# Patient Record
Sex: FEMALE | Race: BLACK OR AFRICAN AMERICAN | NOT HISPANIC OR LATINO
[De-identification: names, ages, dates, MRNs, and addresses within clinical notes are randomized per-mention and may not be internally consistent; named-entity substitution may affect disease eponyms.]

---

## 2018-03-13 PROBLEM — Z00.00 ENCOUNTER FOR PREVENTIVE HEALTH EXAMINATION: Status: ACTIVE | Noted: 2018-03-13

## 2018-03-22 ENCOUNTER — APPOINTMENT (OUTPATIENT)
Dept: MRI IMAGING | Facility: HOSPITAL | Age: 48
End: 2018-03-22

## 2018-09-11 ENCOUNTER — OUTPATIENT (OUTPATIENT)
Dept: OUTPATIENT SERVICES | Facility: HOSPITAL | Age: 48
LOS: 1 days | End: 2018-09-11
Payer: COMMERCIAL

## 2018-09-11 ENCOUNTER — APPOINTMENT (OUTPATIENT)
Dept: MRI IMAGING | Facility: HOSPITAL | Age: 48
End: 2018-09-11

## 2018-09-11 PROCEDURE — 72148 MRI LUMBAR SPINE W/O DYE: CPT

## 2018-09-11 PROCEDURE — 72148 MRI LUMBAR SPINE W/O DYE: CPT | Mod: 26

## 2019-01-18 ENCOUNTER — APPOINTMENT (OUTPATIENT)
Dept: ULTRASOUND IMAGING | Facility: HOSPITAL | Age: 49
End: 2019-01-18

## 2019-09-07 ENCOUNTER — OUTPATIENT (OUTPATIENT)
Dept: OUTPATIENT SERVICES | Facility: HOSPITAL | Age: 49
LOS: 1 days | End: 2019-09-07
Payer: COMMERCIAL

## 2019-09-07 ENCOUNTER — APPOINTMENT (OUTPATIENT)
Dept: ULTRASOUND IMAGING | Facility: HOSPITAL | Age: 49
End: 2019-09-07
Payer: COMMERCIAL

## 2019-09-07 PROCEDURE — 76770 US EXAM ABDO BACK WALL COMP: CPT

## 2019-09-07 PROCEDURE — 76770 US EXAM ABDO BACK WALL COMP: CPT | Mod: 26

## 2019-09-12 ENCOUNTER — EMERGENCY (EMERGENCY)
Facility: HOSPITAL | Age: 49
LOS: 1 days | Discharge: HOME | End: 2019-09-12
Attending: STUDENT IN AN ORGANIZED HEALTH CARE EDUCATION/TRAINING PROGRAM
Payer: COMMERCIAL

## 2019-09-12 VITALS
OXYGEN SATURATION: 100 % | SYSTOLIC BLOOD PRESSURE: 174 MMHG | DIASTOLIC BLOOD PRESSURE: 87 MMHG | RESPIRATION RATE: 16 BRPM | HEART RATE: 76 BPM | TEMPERATURE: 99 F

## 2019-09-12 VITALS
HEART RATE: 72 BPM | SYSTOLIC BLOOD PRESSURE: 169 MMHG | RESPIRATION RATE: 18 BRPM | OXYGEN SATURATION: 99 % | TEMPERATURE: 99 F | DIASTOLIC BLOOD PRESSURE: 58 MMHG

## 2019-09-12 LAB
ALBUMIN SERPL ELPH-MCNC: 4.6 G/DL — SIGNIFICANT CHANGE UP (ref 3.3–5)
ALP SERPL-CCNC: 119 U/L — SIGNIFICANT CHANGE UP (ref 40–120)
ALT FLD-CCNC: 20 U/L — SIGNIFICANT CHANGE UP (ref 4–33)
ANION GAP SERPL CALC-SCNC: 15 MMO/L — HIGH (ref 7–14)
AST SERPL-CCNC: 28 U/L — SIGNIFICANT CHANGE UP (ref 4–32)
BASOPHILS # BLD AUTO: 0.04 K/UL — SIGNIFICANT CHANGE UP (ref 0–0.2)
BASOPHILS NFR BLD AUTO: 0.2 % — SIGNIFICANT CHANGE UP (ref 0–2)
BILIRUB SERPL-MCNC: 0.3 MG/DL — SIGNIFICANT CHANGE UP (ref 0.2–1.2)
BUN SERPL-MCNC: 8 MG/DL — SIGNIFICANT CHANGE UP (ref 7–23)
CALCIUM SERPL-MCNC: 9.5 MG/DL — SIGNIFICANT CHANGE UP (ref 8.4–10.5)
CHLORIDE SERPL-SCNC: 101 MMOL/L — SIGNIFICANT CHANGE UP (ref 98–107)
CO2 SERPL-SCNC: 22 MMOL/L — SIGNIFICANT CHANGE UP (ref 22–31)
CREAT SERPL-MCNC: 0.8 MG/DL — SIGNIFICANT CHANGE UP (ref 0.5–1.3)
EOSINOPHIL # BLD AUTO: 0.45 K/UL — SIGNIFICANT CHANGE UP (ref 0–0.5)
EOSINOPHIL NFR BLD AUTO: 2.7 % — SIGNIFICANT CHANGE UP (ref 0–6)
GLUCOSE SERPL-MCNC: 109 MG/DL — HIGH (ref 70–99)
HCT VFR BLD CALC: 40.1 % — SIGNIFICANT CHANGE UP (ref 34.5–45)
HGB BLD-MCNC: 11.6 G/DL — SIGNIFICANT CHANGE UP (ref 11.5–15.5)
IMM GRANULOCYTES NFR BLD AUTO: 0.6 % — SIGNIFICANT CHANGE UP (ref 0–1.5)
LYMPHOCYTES # BLD AUTO: 1.71 K/UL — SIGNIFICANT CHANGE UP (ref 1–3.3)
LYMPHOCYTES # BLD AUTO: 10.2 % — LOW (ref 13–44)
MAGNESIUM SERPL-MCNC: 2.1 MG/DL — SIGNIFICANT CHANGE UP (ref 1.6–2.6)
MCHC RBC-ENTMCNC: 26.1 PG — LOW (ref 27–34)
MCHC RBC-ENTMCNC: 28.9 % — LOW (ref 32–36)
MCV RBC AUTO: 90.1 FL — SIGNIFICANT CHANGE UP (ref 80–100)
MONOCYTES # BLD AUTO: 1.47 K/UL — HIGH (ref 0–0.9)
MONOCYTES NFR BLD AUTO: 8.8 % — SIGNIFICANT CHANGE UP (ref 2–14)
NEUTROPHILS # BLD AUTO: 13.02 K/UL — HIGH (ref 1.8–7.4)
NEUTROPHILS NFR BLD AUTO: 77.5 % — HIGH (ref 43–77)
NRBC # FLD: 0 K/UL — SIGNIFICANT CHANGE UP (ref 0–0)
PHOSPHATE SERPL-MCNC: 2.5 MG/DL — SIGNIFICANT CHANGE UP (ref 2.5–4.5)
PLATELET # BLD AUTO: 459 K/UL — HIGH (ref 150–400)
PMV BLD: 9.8 FL — SIGNIFICANT CHANGE UP (ref 7–13)
POTASSIUM SERPL-MCNC: 4.6 MMOL/L — SIGNIFICANT CHANGE UP (ref 3.5–5.3)
POTASSIUM SERPL-SCNC: 4.6 MMOL/L — SIGNIFICANT CHANGE UP (ref 3.5–5.3)
PROT SERPL-MCNC: 9.1 G/DL — HIGH (ref 6–8.3)
RBC # BLD: 4.45 M/UL — SIGNIFICANT CHANGE UP (ref 3.8–5.2)
RBC # FLD: 17.2 % — HIGH (ref 10.3–14.5)
SODIUM SERPL-SCNC: 138 MMOL/L — SIGNIFICANT CHANGE UP (ref 135–145)
WBC # BLD: 16.79 K/UL — HIGH (ref 3.8–10.5)
WBC # FLD AUTO: 16.79 K/UL — HIGH (ref 3.8–10.5)

## 2019-09-12 PROCEDURE — 99284 EMERGENCY DEPT VISIT MOD MDM: CPT

## 2019-09-12 RX ORDER — AMPICILLIN SODIUM AND SULBACTAM SODIUM 250; 125 MG/ML; MG/ML
3 INJECTION, POWDER, FOR SUSPENSION INTRAMUSCULAR; INTRAVENOUS ONCE
Refills: 0 | Status: COMPLETED | OUTPATIENT
Start: 2019-09-12 | End: 2019-09-12

## 2019-09-12 RX ORDER — KETOROLAC TROMETHAMINE 30 MG/ML
15 SYRINGE (ML) INJECTION ONCE
Refills: 0 | Status: DISCONTINUED | OUTPATIENT
Start: 2019-09-12 | End: 2019-09-12

## 2019-09-12 RX ADMIN — AMPICILLIN SODIUM AND SULBACTAM SODIUM 200 GRAM(S): 250; 125 INJECTION, POWDER, FOR SUSPENSION INTRAMUSCULAR; INTRAVENOUS at 16:51

## 2019-09-12 RX ADMIN — Medication 15 MILLIGRAM(S): at 16:51

## 2019-09-12 NOTE — ED PROVIDER NOTE - PROGRESS NOTE DETAILS
Discussed case with dental resident. Will come see the patient Dental performed I&D. Can c/w Clindamycin as rx'd by the . Pt has her own outpt dentist to follow up with.

## 2019-09-12 NOTE — ED PROVIDER NOTE - OBJECTIVE STATEMENT
50 y/o F PMHx hypothyroidism, presents to the ED c/o R upper 50 y/o F PMHx hypothyroidism, presents to the ED c/o R upper gum pain x1 day with swelling of her gums and face this morning. Pt states that she was scheduled to have a root canal soon and was treated with Amoxicillin 1 month ago in anticipation of the root canal. SHe went to urgent care today and was told to go to the ED and was rx'd clindamycin 300 mg q8h for which she has taken one dose. Pt denies purulent drainage from the area. Pt endorses minimal drooling but has been able to take PO liquids. Denies dysphagia, change in voice, fever, chills, n/v, cp, sob, or ha.   Has taken motrin/tylenol w/o relief.

## 2019-09-12 NOTE — ED ADULT TRIAGE NOTE - CHIEF COMPLAINT QUOTE
Sent from urgent care for abscess to upper left gum. Given abx but hasn't started yet. Denies fevers.

## 2019-09-12 NOTE — ED PROVIDER NOTE - PHYSICAL EXAMINATION
PHYSICAL EXAM:  GENERAL: non-toxic appearing; in no respiratory distress; no difficulty with phonation; speaks in full sentences  HEAD: Atraumatic, Normocephalic; There is Right sided facial swelling  EYES: PERRL, EOMs intact b/l w/out deficits  ENMT: Moist membranes. There is about a 1cm abscess in the R upper gum above tooth 4/5 without purulent drainage. There is no uvula deviation. The posterior pharyngeal arches are symmetrical and non-erythematous, without swelling, and without exudates. There is cervical lymphadenopathy. No floor of mouth tenderness. Tooth 5 edentulous.   CHEST/LUNG: CTAB no wheezes/rhonchi/rales  HEART: RRR no murmur/gallops/rubs  ABDOMEN: +BS, soft, NT, ND  EXTREMITIES: No LE edema, +2 radial pulses b/l  MUSCULOSKELETAL: FROM of all 4 extremities;  NERVOUS SYSTEM:  A&Ox3, No motor deficits or sensory deficits; CNII-XII intact; no focal neurologic deficits  SKIN:  No new rashes

## 2019-09-12 NOTE — ED PROVIDER NOTE - NSFOLLOWUPINSTRUCTIONS_ED_ALL_ED_FT
You were seen in this emergency department for gum pain and swelling.  The abscess was drained and a drain was put in place. It must be removed in 4 days (9/15/19). Please see your dentist or return to the emergency department for removal of the drain.  Please continue to take your Clindamycin antibiotic as directed.  You may take Motrin and/or Tylenol as needed for pain and fever.  Please return to the emergency department should you develop worsening symptoms, including but not limited to worsening pain, fever, difficulty breathing, difficulty swallowing, chest pain, and/or vomiting.

## 2019-09-12 NOTE — ED PROVIDER NOTE - CLINICAL SUMMARY MEDICAL DECISION MAKING FREE TEXT BOX
50 y/o F PMHx hypothyroidism presents to ED with right upper gum swelling, pain x1 day. No purulent drainage. No fever, dysphagia, sob, cp. VSS. Afebrile. Pt phonating well. There is an approximately 1cm abscess in the right upper gums around teeth 4/5 with facial swelling and cervical l ymphadenopathy. Low concern for RPA/PTA as there is no pooling of secretions, uvula midline, posterior pharyngeal arches symmetrical, non erythematous. Will obtain cbc, cmp, start unasyn, toradol for pain, and consult dental

## 2019-09-12 NOTE — ED PROVIDER NOTE - NS ED ROS FT
Constitutional: no fevers or chills  HEENT: Gum swelling and pain; no visual changes, no sore throat, no rhinorrhea  CV: no cp or palpitations  Resp: no sob or cough  GI: no abd pain, no nausea, no vomiting, no diarrhea, no constipation  : no dysuria, no hematuria  MSK: no myalgais or arthralgias  skin: no rashes  neuro: no HA, no confusion; no numbness; no weakness, no tingling  psych: no SI/HI  heme: no LAD

## 2019-09-12 NOTE — ED PROVIDER NOTE - PATIENT PORTAL LINK FT
You can access the FollowMyHealth Patient Portal offered by [facility] by registering at the following website: [email or web address]. By joining [employer]’s FollowMyHealth portal, you will also be able to view your health information using other applications (apps) compatible with our system.

## 2019-09-12 NOTE — ED PROVIDER NOTE - ATTENDING CONTRIBUTION TO CARE
48 y/o F PMHx hypothyroidism presents to ED with right upper gum swelling, pain x1 day. No purulent drainage. No fever, dysphagia, sob, cp. VSS. Afebrile. Pt phonating well. There is an approximately 1cm abscess in the right upper gums around teeth 4/5 with facial swelling and cervical l ymphadenopathy. Low concern for RPA/PTA as there is no pooling of secretions, uvula midline, posterior pharyngeal arches symmetrical, non erythematous. Will obtain cbc, cmp, start unasyn, toradol for pain, and consult dental    DDavida Tadeo: I have personally performed a face to face bedside history and physical examination of this patient. I have discussed the history, examination, review of systems, assessment and plan of management with the resident. I have reviewed the electronic medical record and amended it to reflect my history, review of systems, physical exam, assessment and plan.

## 2019-09-13 ENCOUNTER — TRANSCRIPTION ENCOUNTER (OUTPATIENT)
Age: 49
End: 2019-09-13

## 2019-09-13 NOTE — PROGRESS NOTE ADULT - SUBJECTIVE AND OBJECTIVE BOX
Patient is a 49y old  Female who presents with a chief complaint of dental abscess    HPI: As per ED: 50 y/o F PMHx hypothyroidism, presents to the ED c/o R upper gum pain x1 day with swelling of her gums and face this morning. Pt states that she was scheduled to have a root canal soon and was treated with Amoxicillin 1 month ago in anticipation of the root canal. She went to urgent care today and was told to go to the ED and was rx'd clindamycin 300 mg q8h for which she has taken one dose. Pt denies purulent drainage from the area. Pt endorses minimal drooling but has been able to take PO liquids. Denies dysphagia, change in voice, fever, chills, n/v, cp, sob, or ha  Patient states that pain began last night at 18:00. She first noticed swelling on Monday. Pain is spontaneous. Patient has been using orajel, allieve, and salt water to alleviate dental pain.     PAST MEDICAL & SURGICAL HISTORY:    ( -  ) heart valve replacement  ( -  ) joint replacement  ( -  ) pregnancy    MEDICATIONS  (STANDING):    MEDICATIONS  (PRN):      Allergies    erythromycin (Vomiting)    Intolerances        FAMILY HISTORY: No pertinent family hx      SOCIAL HISTORY: Pt presents to ED alone    Last Dental Visit: 1 month ago    Vital Signs Last 24 Hrs  T(C): 37.2 (12 Sep 2019 18:39), Max: 37.3 (12 Sep 2019 15:05)  T(F): 99 (12 Sep 2019 18:39), Max: 99.1 (12 Sep 2019 15:05)  HR: 72 (12 Sep 2019 18:39) (72 - 76)  BP: 169/58 (12 Sep 2019 18:39) (169/58 - 174/87)  BP(mean): --  RR: 18 (12 Sep 2019 18:39) (16 - 18)  SpO2: 99% (12 Sep 2019 18:39) (99% - 100%)    EOE:  TMJ ( -  ) clicks                    (  -  ) pops                    (  -  ) crepitus             Mandible FROM             Facial bones and MOM grossly intact             ( -  ) trismus             ( -  ) LAD             ( + ) swelling: left buccal space swelling             ( +  ) asymmetry: left buccal space swelling             ( +  ) palpation: tender to palpation on left maxilla             ( -  ) SOB             ( -  ) dysphagia             ( -  ) LOC    IOE:  permanent dentition: multiple carious teeth and multiple missing teeth           hard/soft palate:  ( -  ) palatal torus           tongue/FOM WNL           labial/buccal mucosa: fluctuant swelling buccal to teeth #13 and #14           (  + ) percussion: percussion tender to tooth #14           (  + ) palpation: tender to palpation in area of fluctuant swelling           (  + ) swelling: fluctuant swelling buccal to teeth #13 and #14, more localized by tooth #13    Dentition present: Complete permanent dentition, Root tip #13  Mobility: No gross mobility noted at time of consult  Caries: Caries tooth #13 and #14    Radiographs: 1 PA of tooth #13/14, interpreted: PARL on tooth #13 and #14, Root tip #13, gross decay #14    LABS:                        11.6   16.79 )-----------( 459      ( 12 Sep 2019 16:48 )             40.1     09-12    138  |  101  |  8   ----------------------------<  109<H>  4.6   |  22  |  0.80    Ca    9.5      12 Sep 2019 16:48  Phos  2.5     09-12  Mg     2.1     09-12    TPro  9.1<H>  /  Alb  4.6  /  TBili  0.3  /  DBili  x   /  AST  28  /  ALT  20  /  AlkPhos  119  09-12    WBC Count: 16.79 K/uL <H> [3.8 - 10.5] (09-12 @ 16:48)    Platelet Count - Automated: 459 K/uL <H> [150 - 400] (09-12 @ 16:48)    ASSESSMENT: Root tip #13, gross decay #13 and #14, PARL present on #13 and #14  Reviewed radiographic and clinical findings with patient. Recommend extraction of tooth #13 and follow up with outpatient dentist for different treatment options for tooth #14. Recommend incision and drainage of fluctuant swelling.    PROCEDURE: EOE/IOE, 1 PA rad, incision and drainage buccal to tooth #13/14  Reviewed RBA. All questions answered. Informed consent obtained.   20% topical benzocaine, 3.0cc 2% lidocaine w/1:100k epi for local infiltration about teeth #13, 14 and abscess. 1.5cm incision made at buccal vestibule by teeth #13 and #14, tissue dissected with hemostat. Purulence drained from abscess. Surrounding tissues palpated firmly to express purulence. Incision site copiously irrigated with sterile saline. Penrose drain secured with 3-0 BSS. POIG. All questions answered.  Verbal and written consent given.     RECOMMENDATIONS:   1) Follow up with [facility] ED or outpatient dentist for removal of drain in 48 hours.  2) Salt water rinses after every meal.  3) Continue Clindamycin which was prescribed to patient by urgent care.  4) Dental F/U with outpatient dentist for comprehensive dental care.   5) If any difficulty swallowing/breathing, fever occur, page dental.     Dee Gilman DDS, #[id number]

## 2019-09-15 ENCOUNTER — EMERGENCY (EMERGENCY)
Facility: HOSPITAL | Age: 49
LOS: 1 days | Discharge: HOME | End: 2019-09-15
Attending: EMERGENCY MEDICINE | Admitting: EMERGENCY MEDICINE

## 2019-09-15 VITALS
OXYGEN SATURATION: 100 % | HEART RATE: 71 BPM | RESPIRATION RATE: 16 BRPM | SYSTOLIC BLOOD PRESSURE: 153 MMHG | DIASTOLIC BLOOD PRESSURE: 79 MMHG | TEMPERATURE: 99 F

## 2019-09-15 NOTE — PROGRESS NOTE ADULT - SUBJECTIVE AND OBJECTIVE BOX
Patient is a 49y old  Female who presents for penrose drain removal.    CC: "I'm here to get my drain taken out."    HPI: Pt states she had swelling upper left maxillary vestibule that extended extraorally to lower eye on Thursday. Pt states she got I&D at [facility] on Thursday (9/12/2019) and swelling/pain has completely resolved.       PAST MEDICAL & SURGICAL HISTORY:  No pertinent past medical history  No significant past surgical history    MEDICATIONS  (STANDING):  None    Allergies:  erythromycin (Vomiting)    *Last Dental Visit: A few weeks ago (does not remember exactly when)    Vital Signs Last 24 Hrs  T(C): 37.1 (15 Sep 2019 15:36), Max: 37.1 (15 Sep 2019 15:36)  T(F): 98.7 (15 Sep 2019 15:36), Max: 98.7 (15 Sep 2019 15:36)  HR: 71 (15 Sep 2019 15:36) (71 - 71)  BP: 153/79 (15 Sep 2019 15:36) (153/79 - 153/79)  BP(mean): --  RR: 16 (15 Sep 2019 15:36) (16 - 16)  SpO2: 100% (15 Sep 2019 15:36) (100% - 100%)    EOE:  TMJ ( -  ) clicks                    ( -   ) pops                    ( -   ) crepitus             Mandible: FROM             Facial bones and MOM: grossly intact             ( -  ) trismus             ( -  ) LAD             ( -  ) swelling             ( -  ) asymmetry             ( -  ) palpation             ( -  ) SOB             ( -  ) dysphagia             ( -  ) LOC  No swellings, no asymmetries, no abnormalities detected.     IOE:   Permanent dentition grossly intact.            hard/soft palate:  WNL, (  - ) palatal torus           tongue/FOM: WNL           labial/buccal mucosa: WNL           ( -  ) percussion           ( -  ) palpation           ( -  ) swelling   Mobility: -    *DENTAL RADIOGRAPHS:  None obtained    ASSESSMENT: F/U for drain removal. No swellings, no asymmetries, no abnormalities detected. Pt states she is completely out of pain and swelling has completely subsided since drain placement on Thursday (9/12/2019).    PROCEDURE: Verbal informed consent obtained. Drain and silk suture removed. Irrigated site with saline solution. Site healing well.    RECOMMENDATIONS:  1) Dental F/U with outpatient dentist for comprehensive dental care  2) Soft diet for next two weeks  3) Chlorhexidine rinse per ED for next 7-10 days  3) If any difficulty swallowing/breathing, fever occur, page dental    Ayesha Frazier [facility], #[id number]

## 2019-09-15 NOTE — ED PROVIDER NOTE - OBJECTIVE STATEMENT
49 year old female with a pmhx of hypothyroidism, presents to ED for removal of dental drain. Patient had the drain placed 2 days ago for a left buccal abscess. She was instructed to return in 48 hours for removal. Patient states her symptoms have improved and pain is minimal. She has been taking the Clindamycin as prescribed. She denies any fever, chills, difficulty swallowing, abd pain, or diarrhea.

## 2019-09-15 NOTE — ED PROVIDER NOTE - ENMT, MLM
Airway patent, Nasal mucosa clear. Mouth with normal mucosa. no abscess visualized at left buccal space Airway patent, Nasal mucosa clear. Mouth with normal mucosa. no abscess visualized at left buccal space, drain at left upper gingival space without surrounding erythema, no active drainage

## 2019-09-15 NOTE — ED PROVIDER NOTE - ATTENDING CONTRIBUTION TO CARE
DR. GONZALES, ATTENDING MD-  I have reviewed and discussed the medical student’s documentation and findings with the student. After personally examining the patient, my findings have been added to this documentation.    48 y/o female s/p i&d and dental drain placement here for scheduled removal.  Denies f/c, pain, drainage or any other complaints.  Taking abx as prescribed.  Denies ha, neck stiffness, cp, sob, cough, abd pain, n/v/d, dysuria, rash.  Afebrile, vs wnl, nad, dental drain in place at left upper incisor without active drainage or gingival swelling, ctabil, s1s2 rrr no m/r/g, abd soft non ttp no r/g, no cva tenderness b/l, no leg swelling b/l, no rash.  Pt here with dental drain for removal of drain, no signs of return of abscess, will consult dental for drain extraction.

## 2019-09-15 NOTE — ED PROVIDER NOTE - PLAN OF CARE
You were seen today for removal of your dental drain.  Rinse your mouth with salt water every night.  Follow up with your dentist for re-evaluation at the next available appointment.  RETURN TO THE EMERGENCY DEPARTMENT IMMEDIATELY IF YOU DEVELOP FEVER, PAIN, PUS DRAINING, OR FOR ANY OTHER CONCERN.

## 2019-09-15 NOTE — ED PROVIDER NOTE - CARE PLAN
Principal Discharge DX:	Dental abscess Principal Discharge DX:	Dental abscess  Assessment and plan of treatment:	You were seen today for removal of your dental drain.  Rinse your mouth with salt water every night.  Follow up with your dentist for re-evaluation at the next available appointment.  RETURN TO THE EMERGENCY DEPARTMENT IMMEDIATELY IF YOU DEVELOP FEVER, PAIN, PUS DRAINING, OR FOR ANY OTHER CONCERN.

## 2019-09-15 NOTE — ED ADULT TRIAGE NOTE - CHIEF COMPLAINT QUOTE
pt amb to triage s/p dental drain placement, as per paperwork advised to come to ED today for drain removal, denies medical complaints/issues @ this time, states presented as per d/c papers

## 2019-09-15 NOTE — ED PROVIDER NOTE - PATIENT PORTAL LINK FT
You can access the FollowMyHealth Patient Portal offered by [facility] by registering at the following website: [email or web address]. By joining [employer]’s FollowMyHealth portal, you will also be able to view your health information using other applications (apps) compatible with our system.

## 2019-09-15 NOTE — ED PROVIDER NOTE - CLINICAL SUMMARY MEDICAL DECISION MAKING FREE TEXT BOX
49 year old female with pmhx of hypothyroid, presents to ED for dental drain removal. Drain was placed 2 days ago for left buccal abscess. Pt with minimal pain, improving on Clindamycin. Dental consulted to evaluate pt in ED for drain removal. Vitals stable, no fevers 49 year old female with pmhx of hypothyroid, presents to ED for dental drain removal. Drain was placed 2 days ago for left buccal abscess. Pt with minimal pain, improving on Clindamycin. Dental consulted to evaluate pt in ED for drain removal. Vitals stable, no fevers    See "Attending Attestation" for the attending's MDM.

## 2019-12-14 ENCOUNTER — EMERGENCY (EMERGENCY)
Facility: HOSPITAL | Age: 49
LOS: 1 days | Discharge: HOME | End: 2019-12-14
Attending: EMERGENCY MEDICINE | Admitting: EMERGENCY MEDICINE
Payer: COMMERCIAL

## 2019-12-14 VITALS
DIASTOLIC BLOOD PRESSURE: 61 MMHG | TEMPERATURE: 99 F | SYSTOLIC BLOOD PRESSURE: 155 MMHG | RESPIRATION RATE: 19 BRPM | OXYGEN SATURATION: 99 % | HEART RATE: 84 BPM

## 2019-12-14 DIAGNOSIS — R10.11 RIGHT UPPER QUADRANT PAIN: ICD-10-CM

## 2019-12-14 DIAGNOSIS — R42 DIZZINESS AND GIDDINESS: ICD-10-CM

## 2019-12-14 DIAGNOSIS — R10.13 EPIGASTRIC PAIN: ICD-10-CM

## 2019-12-14 DIAGNOSIS — R11.0 NAUSEA: ICD-10-CM

## 2019-12-14 LAB
BASOPHILS # BLD AUTO: 0.01 K/UL — SIGNIFICANT CHANGE UP (ref 0–0.2)
BASOPHILS NFR BLD AUTO: 0.1 % — SIGNIFICANT CHANGE UP (ref 0–2)
EOSINOPHIL # BLD AUTO: 0.19 K/UL — SIGNIFICANT CHANGE UP (ref 0–0.5)
EOSINOPHIL NFR BLD AUTO: 2.8 % — SIGNIFICANT CHANGE UP (ref 0–6)
HCT VFR BLD CALC: 34.8 % — SIGNIFICANT CHANGE UP (ref 34.5–45)
HGB BLD-MCNC: 10.7 G/DL — LOW (ref 11.5–15.5)
IMM GRANULOCYTES NFR BLD AUTO: 0.1 % — SIGNIFICANT CHANGE UP (ref 0–1.5)
LYMPHOCYTES # BLD AUTO: 1.3 K/UL — SIGNIFICANT CHANGE UP (ref 1–3.3)
LYMPHOCYTES # BLD AUTO: 18.9 % — SIGNIFICANT CHANGE UP (ref 13–44)
MCHC RBC-ENTMCNC: 28.4 PG — SIGNIFICANT CHANGE UP (ref 27–34)
MCHC RBC-ENTMCNC: 30.7 GM/DL — LOW (ref 32–36)
MCV RBC AUTO: 92.3 FL — SIGNIFICANT CHANGE UP (ref 80–100)
MONOCYTES # BLD AUTO: 1.07 K/UL — HIGH (ref 0–0.9)
MONOCYTES NFR BLD AUTO: 15.6 % — HIGH (ref 2–14)
NEUTROPHILS # BLD AUTO: 4.3 K/UL — SIGNIFICANT CHANGE UP (ref 1.8–7.4)
NEUTROPHILS NFR BLD AUTO: 62.5 % — SIGNIFICANT CHANGE UP (ref 43–77)
NRBC # BLD: 0 /100 WBCS — SIGNIFICANT CHANGE UP (ref 0–0)
PLATELET # BLD AUTO: 364 K/UL — SIGNIFICANT CHANGE UP (ref 150–400)
RBC # BLD: 3.77 M/UL — LOW (ref 3.8–5.2)
RBC # FLD: 14.9 % — HIGH (ref 10.3–14.5)
WBC # BLD: 6.88 K/UL — SIGNIFICANT CHANGE UP (ref 3.8–10.5)
WBC # FLD AUTO: 6.88 K/UL — SIGNIFICANT CHANGE UP (ref 3.8–10.5)

## 2019-12-14 PROCEDURE — 99284 EMERGENCY DEPT VISIT MOD MDM: CPT

## 2019-12-14 PROCEDURE — 76705 ECHO EXAM OF ABDOMEN: CPT | Mod: 26

## 2019-12-14 PROCEDURE — 93010 ELECTROCARDIOGRAM REPORT: CPT

## 2019-12-14 RX ORDER — FAMOTIDINE 10 MG/ML
20 INJECTION INTRAVENOUS ONCE
Refills: 0 | Status: COMPLETED | OUTPATIENT
Start: 2019-12-14 | End: 2019-12-14

## 2019-12-14 RX ORDER — SODIUM CHLORIDE 9 MG/ML
1000 INJECTION INTRAMUSCULAR; INTRAVENOUS; SUBCUTANEOUS ONCE
Refills: 0 | Status: COMPLETED | OUTPATIENT
Start: 2019-12-14 | End: 2019-12-14

## 2019-12-14 RX ORDER — ACETAMINOPHEN 500 MG
1000 TABLET ORAL ONCE
Refills: 0 | Status: COMPLETED | OUTPATIENT
Start: 2019-12-14 | End: 2019-12-14

## 2019-12-14 RX ORDER — ONDANSETRON 8 MG/1
4 TABLET, FILM COATED ORAL ONCE
Refills: 0 | Status: COMPLETED | OUTPATIENT
Start: 2019-12-14 | End: 2019-12-14

## 2019-12-14 RX ADMIN — ONDANSETRON 4 MILLIGRAM(S): 8 TABLET, FILM COATED ORAL at 23:48

## 2019-12-14 RX ADMIN — SODIUM CHLORIDE 1000 MILLILITER(S): 9 INJECTION INTRAMUSCULAR; INTRAVENOUS; SUBCUTANEOUS at 23:43

## 2019-12-14 RX ADMIN — FAMOTIDINE 20 MILLIGRAM(S): 10 INJECTION INTRAVENOUS at 23:48

## 2019-12-14 RX ADMIN — Medication 400 MILLIGRAM(S): at 23:52

## 2019-12-15 VITALS
OXYGEN SATURATION: 98 % | TEMPERATURE: 98 F | SYSTOLIC BLOOD PRESSURE: 125 MMHG | DIASTOLIC BLOOD PRESSURE: 51 MMHG | RESPIRATION RATE: 18 BRPM | HEART RATE: 78 BPM

## 2019-12-15 DIAGNOSIS — Z98.51 TUBAL LIGATION STATUS: Chronic | ICD-10-CM

## 2019-12-15 DIAGNOSIS — Z90.09 ACQUIRED ABSENCE OF OTHER PART OF HEAD AND NECK: Chronic | ICD-10-CM

## 2019-12-15 LAB
ALBUMIN SERPL ELPH-MCNC: 3.9 G/DL — SIGNIFICANT CHANGE UP (ref 3.3–5)
ALP SERPL-CCNC: 88 U/L — SIGNIFICANT CHANGE UP (ref 40–120)
ALT FLD-CCNC: 39 U/L — SIGNIFICANT CHANGE UP (ref 10–45)
ANION GAP SERPL CALC-SCNC: 8 MMOL/L — SIGNIFICANT CHANGE UP (ref 5–17)
APPEARANCE UR: CLEAR — SIGNIFICANT CHANGE UP
APTT BLD: 26.5 SEC — LOW (ref 27.5–36.3)
AST SERPL-CCNC: 38 U/L — SIGNIFICANT CHANGE UP (ref 10–40)
BILIRUB DIRECT SERPL-MCNC: <0.2 MG/DL — SIGNIFICANT CHANGE UP (ref 0–0.2)
BILIRUB INDIRECT FLD-MCNC: >0 MG/DL — LOW (ref 0.2–1)
BILIRUB SERPL-MCNC: 0.2 MG/DL — SIGNIFICANT CHANGE UP (ref 0.2–1.2)
BILIRUB SERPL-MCNC: 0.2 MG/DL — SIGNIFICANT CHANGE UP (ref 0.2–1.2)
BILIRUB UR-MCNC: NEGATIVE — SIGNIFICANT CHANGE UP
BUN SERPL-MCNC: 12 MG/DL — SIGNIFICANT CHANGE UP (ref 7–23)
CALCIUM SERPL-MCNC: 8.9 MG/DL — SIGNIFICANT CHANGE UP (ref 8.4–10.5)
CHLORIDE SERPL-SCNC: 104 MMOL/L — SIGNIFICANT CHANGE UP (ref 96–108)
CO2 SERPL-SCNC: 26 MMOL/L — SIGNIFICANT CHANGE UP (ref 22–31)
COLOR SPEC: YELLOW — SIGNIFICANT CHANGE UP
CREAT SERPL-MCNC: 0.84 MG/DL — SIGNIFICANT CHANGE UP (ref 0.5–1.3)
DIFF PNL FLD: ABNORMAL
GLUCOSE SERPL-MCNC: 120 MG/DL — HIGH (ref 70–99)
GLUCOSE UR QL: NEGATIVE — SIGNIFICANT CHANGE UP
INR BLD: 1.04 — SIGNIFICANT CHANGE UP (ref 0.88–1.16)
KETONES UR-MCNC: NEGATIVE — SIGNIFICANT CHANGE UP
LEUKOCYTE ESTERASE UR-ACNC: ABNORMAL
LIDOCAIN IGE QN: 30 U/L — SIGNIFICANT CHANGE UP (ref 7–60)
MAGNESIUM SERPL-MCNC: 1.8 MG/DL — SIGNIFICANT CHANGE UP (ref 1.6–2.6)
NITRITE UR-MCNC: NEGATIVE — SIGNIFICANT CHANGE UP
PH UR: 6 — SIGNIFICANT CHANGE UP (ref 5–8)
POTASSIUM SERPL-MCNC: 3.9 MMOL/L — SIGNIFICANT CHANGE UP (ref 3.5–5.3)
POTASSIUM SERPL-SCNC: 3.9 MMOL/L — SIGNIFICANT CHANGE UP (ref 3.5–5.3)
PROT SERPL-MCNC: 7.5 G/DL — SIGNIFICANT CHANGE UP (ref 6–8.3)
PROT UR-MCNC: NEGATIVE MG/DL — SIGNIFICANT CHANGE UP
PROTHROM AB SERPL-ACNC: 11.8 SEC — SIGNIFICANT CHANGE UP (ref 10–12.9)
SODIUM SERPL-SCNC: 138 MMOL/L — SIGNIFICANT CHANGE UP (ref 135–145)
SP GR SPEC: 1.02 — SIGNIFICANT CHANGE UP (ref 1–1.03)
UROBILINOGEN FLD QL: 0.2 E.U./DL — SIGNIFICANT CHANGE UP

## 2019-12-15 PROCEDURE — 81001 URINALYSIS AUTO W/SCOPE: CPT

## 2019-12-15 PROCEDURE — 96375 TX/PRO/DX INJ NEW DRUG ADDON: CPT

## 2019-12-15 PROCEDURE — 82248 BILIRUBIN DIRECT: CPT

## 2019-12-15 PROCEDURE — 82247 BILIRUBIN TOTAL: CPT

## 2019-12-15 PROCEDURE — 99284 EMERGENCY DEPT VISIT MOD MDM: CPT | Mod: 25

## 2019-12-15 PROCEDURE — 76705 ECHO EXAM OF ABDOMEN: CPT

## 2019-12-15 PROCEDURE — 36415 COLL VENOUS BLD VENIPUNCTURE: CPT

## 2019-12-15 PROCEDURE — 85730 THROMBOPLASTIN TIME PARTIAL: CPT

## 2019-12-15 PROCEDURE — 83690 ASSAY OF LIPASE: CPT

## 2019-12-15 PROCEDURE — 80053 COMPREHEN METABOLIC PANEL: CPT

## 2019-12-15 PROCEDURE — 96376 TX/PRO/DX INJ SAME DRUG ADON: CPT

## 2019-12-15 PROCEDURE — 83735 ASSAY OF MAGNESIUM: CPT

## 2019-12-15 PROCEDURE — 85610 PROTHROMBIN TIME: CPT

## 2019-12-15 PROCEDURE — 93005 ELECTROCARDIOGRAM TRACING: CPT

## 2019-12-15 PROCEDURE — 96374 THER/PROPH/DIAG INJ IV PUSH: CPT

## 2019-12-15 PROCEDURE — 85025 COMPLETE CBC W/AUTO DIFF WBC: CPT

## 2019-12-15 RX ORDER — FAMOTIDINE 10 MG/ML
1 INJECTION INTRAVENOUS
Qty: 28 | Refills: 0
Start: 2019-12-15 | End: 2019-12-28

## 2019-12-15 RX ORDER — ONDANSETRON 8 MG/1
1 TABLET, FILM COATED ORAL
Qty: 12 | Refills: 0
Start: 2019-12-15 | End: 2019-12-18

## 2019-12-15 RX ORDER — ONDANSETRON 8 MG/1
4 TABLET, FILM COATED ORAL ONCE
Refills: 0 | Status: COMPLETED | OUTPATIENT
Start: 2019-12-15 | End: 2019-12-15

## 2019-12-15 RX ADMIN — Medication 1000 MILLIGRAM(S): at 00:30

## 2019-12-15 RX ADMIN — ONDANSETRON 4 MILLIGRAM(S): 8 TABLET, FILM COATED ORAL at 02:40

## 2019-12-15 NOTE — ED PROVIDER NOTE - PROGRESS NOTE DETAILS
mild nausea, abd pain improved.  pt offered CT, however declines at this time. states will f/u with PMD  I have discussed the discharge plan with the patient. The patient agrees with the plan, as discussed.  The patient understands Emergency Department diagnosis is a preliminary diagnosis often based on limited information and that the patient must adhere to the follow-up plan as discussed.  The patient understands that if the symptoms worsen or if prescribed medications do not have the desired/planned effect that the patient may return to the Emergency Department at any time for further evaluation and treatment.

## 2019-12-15 NOTE — ED ADULT NURSE NOTE - OBJECTIVE STATEMENT
48 y/o female received into the ED, axox3, stating that she is having RUQ pain x1 day.  Pt. is also complaining of nausea, no vomiting.

## 2019-12-15 NOTE — ED PROVIDER NOTE - PATIENT PORTAL LINK FT
You can access the FollowMyHealth Patient Portal offered by [facility] by registering at the following website: [email or web address]. By joining [employer]’s FollowMyHealth portal, you will also be able to view your health information using other applications (apps) compatible with our system.

## 2019-12-15 NOTE — ED PROVIDER NOTE - OBJECTIVE STATEMENT
49F hx hypothyroid, c/o upper abd pain. pt states feeling unwell for past 3 days.  +nausea, no vomiting. +pain to RUQ radiating across upper abd.  no fevers. decreased appetite.  +lightheaded.  no recent travel.  states has had similar pain in past with negative workup about a year ago.  took motrin with minimal relief.

## 2019-12-16 ENCOUNTER — EMERGENCY (EMERGENCY)
Facility: HOSPITAL | Age: 49
LOS: 1 days | Discharge: HOME | End: 2019-12-16
Attending: EMERGENCY MEDICINE | Admitting: EMERGENCY MEDICINE
Payer: COMMERCIAL

## 2019-12-16 VITALS
DIASTOLIC BLOOD PRESSURE: 80 MMHG | TEMPERATURE: 98 F | HEIGHT: 65 IN | OXYGEN SATURATION: 100 % | SYSTOLIC BLOOD PRESSURE: 135 MMHG | RESPIRATION RATE: 20 BRPM | WEIGHT: 164.91 LBS | HEART RATE: 81 BPM

## 2019-12-16 DIAGNOSIS — Z90.09 ACQUIRED ABSENCE OF OTHER PART OF HEAD AND NECK: Chronic | ICD-10-CM

## 2019-12-16 DIAGNOSIS — Z98.51 TUBAL LIGATION STATUS: Chronic | ICD-10-CM

## 2019-12-16 LAB
ALBUMIN SERPL ELPH-MCNC: 3.6 G/DL — SIGNIFICANT CHANGE UP (ref 3.3–5)
ALP SERPL-CCNC: 89 U/L — SIGNIFICANT CHANGE UP (ref 40–120)
ALT FLD-CCNC: 35 U/L — SIGNIFICANT CHANGE UP (ref 10–45)
ANION GAP SERPL CALC-SCNC: 10 MMOL/L — SIGNIFICANT CHANGE UP (ref 5–17)
AST SERPL-CCNC: 38 U/L — SIGNIFICANT CHANGE UP (ref 10–40)
BASOPHILS # BLD AUTO: 0.01 K/UL — SIGNIFICANT CHANGE UP (ref 0–0.2)
BASOPHILS NFR BLD AUTO: 0.2 % — SIGNIFICANT CHANGE UP (ref 0–2)
BILIRUB SERPL-MCNC: 0.2 MG/DL — SIGNIFICANT CHANGE UP (ref 0.2–1.2)
BUN SERPL-MCNC: 6 MG/DL — LOW (ref 7–23)
CALCIUM SERPL-MCNC: 8.7 MG/DL — SIGNIFICANT CHANGE UP (ref 8.4–10.5)
CHLORIDE SERPL-SCNC: 104 MMOL/L — SIGNIFICANT CHANGE UP (ref 96–108)
CO2 SERPL-SCNC: 24 MMOL/L — SIGNIFICANT CHANGE UP (ref 22–31)
CREAT SERPL-MCNC: 0.82 MG/DL — SIGNIFICANT CHANGE UP (ref 0.5–1.3)
EOSINOPHIL # BLD AUTO: 0.08 K/UL — SIGNIFICANT CHANGE UP (ref 0–0.5)
EOSINOPHIL NFR BLD AUTO: 1.4 % — SIGNIFICANT CHANGE UP (ref 0–6)
GLUCOSE SERPL-MCNC: 106 MG/DL — HIGH (ref 70–99)
HCG SERPL-ACNC: 0 MIU/ML — SIGNIFICANT CHANGE UP
HCT VFR BLD CALC: 36.7 % — SIGNIFICANT CHANGE UP (ref 34.5–45)
HGB BLD-MCNC: 11.4 G/DL — LOW (ref 11.5–15.5)
IMM GRANULOCYTES NFR BLD AUTO: 0.2 % — SIGNIFICANT CHANGE UP (ref 0–1.5)
LACTATE SERPL-SCNC: 1.6 MMOL/L — SIGNIFICANT CHANGE UP (ref 0.5–2)
LIDOCAIN IGE QN: 17 U/L — SIGNIFICANT CHANGE UP (ref 7–60)
LYMPHOCYTES # BLD AUTO: 1.11 K/UL — SIGNIFICANT CHANGE UP (ref 1–3.3)
LYMPHOCYTES # BLD AUTO: 19.5 % — SIGNIFICANT CHANGE UP (ref 13–44)
MCHC RBC-ENTMCNC: 28.1 PG — SIGNIFICANT CHANGE UP (ref 27–34)
MCHC RBC-ENTMCNC: 31.1 GM/DL — LOW (ref 32–36)
MCV RBC AUTO: 90.4 FL — SIGNIFICANT CHANGE UP (ref 80–100)
MONOCYTES # BLD AUTO: 0.95 K/UL — HIGH (ref 0–0.9)
MONOCYTES NFR BLD AUTO: 16.7 % — HIGH (ref 2–14)
NEUTROPHILS # BLD AUTO: 3.52 K/UL — SIGNIFICANT CHANGE UP (ref 1.8–7.4)
NEUTROPHILS NFR BLD AUTO: 62 % — SIGNIFICANT CHANGE UP (ref 43–77)
NRBC # BLD: 0 /100 WBCS — SIGNIFICANT CHANGE UP (ref 0–0)
PLATELET # BLD AUTO: 384 K/UL — SIGNIFICANT CHANGE UP (ref 150–400)
POTASSIUM SERPL-MCNC: 3.9 MMOL/L — SIGNIFICANT CHANGE UP (ref 3.5–5.3)
POTASSIUM SERPL-SCNC: 3.9 MMOL/L — SIGNIFICANT CHANGE UP (ref 3.5–5.3)
PROT SERPL-MCNC: 7.1 G/DL — SIGNIFICANT CHANGE UP (ref 6–8.3)
RBC # BLD: 4.06 M/UL — SIGNIFICANT CHANGE UP (ref 3.8–5.2)
RBC # FLD: 14.5 % — SIGNIFICANT CHANGE UP (ref 10.3–14.5)
SODIUM SERPL-SCNC: 138 MMOL/L — SIGNIFICANT CHANGE UP (ref 135–145)
TROPONIN T SERPL-MCNC: <0.01 NG/ML — SIGNIFICANT CHANGE UP (ref 0–0.01)
WBC # BLD: 5.68 K/UL — SIGNIFICANT CHANGE UP (ref 3.8–10.5)
WBC # FLD AUTO: 5.68 K/UL — SIGNIFICANT CHANGE UP (ref 3.8–10.5)

## 2019-12-16 PROCEDURE — 74174 CTA ABD&PLVS W/CONTRAST: CPT | Mod: 26

## 2019-12-16 PROCEDURE — 99284 EMERGENCY DEPT VISIT MOD MDM: CPT

## 2019-12-16 PROCEDURE — 93010 ELECTROCARDIOGRAM REPORT: CPT

## 2019-12-16 RX ORDER — ONDANSETRON 8 MG/1
4 TABLET, FILM COATED ORAL ONCE
Refills: 0 | Status: COMPLETED | OUTPATIENT
Start: 2019-12-16 | End: 2019-12-16

## 2019-12-16 RX ORDER — ACETAMINOPHEN 500 MG
650 TABLET ORAL ONCE
Refills: 0 | Status: COMPLETED | OUTPATIENT
Start: 2019-12-16 | End: 2019-12-16

## 2019-12-16 RX ORDER — SODIUM CHLORIDE 9 MG/ML
1000 INJECTION INTRAMUSCULAR; INTRAVENOUS; SUBCUTANEOUS ONCE
Refills: 0 | Status: COMPLETED | OUTPATIENT
Start: 2019-12-16 | End: 2019-12-16

## 2019-12-16 RX ADMIN — Medication 30 MILLILITER(S): at 20:57

## 2019-12-16 RX ADMIN — ONDANSETRON 4 MILLIGRAM(S): 8 TABLET, FILM COATED ORAL at 20:57

## 2019-12-16 RX ADMIN — SODIUM CHLORIDE 1000 MILLILITER(S): 9 INJECTION INTRAMUSCULAR; INTRAVENOUS; SUBCUTANEOUS at 20:57

## 2019-12-16 RX ADMIN — Medication 650 MILLIGRAM(S): at 20:57

## 2019-12-16 RX ADMIN — ONDANSETRON 4 MILLIGRAM(S): 8 TABLET, FILM COATED ORAL at 23:41

## 2019-12-16 NOTE — ED PROVIDER NOTE - PHYSICAL EXAMINATION
no LE edema, normal equal distal pulses.  abd: BS+, soft, +minimal epigastric/LUQ ttp, no rebound/guarding. no CVAT.

## 2019-12-16 NOTE — ED PROVIDER NOTE - CARE PLAN
Principal Discharge DX:	Abdominal pain Principal Discharge DX:	Abdominal pain  Secondary Diagnosis:	Nausea

## 2019-12-16 NOTE — ED ADULT TRIAGE NOTE - CHIEF COMPLAINT QUOTE
Patient presents complaining of worsening RUQ abdominal pain with nausea has not eaten since THursday.  +Chills denies fever.

## 2019-12-16 NOTE — ED PROVIDER NOTE - SHIFT CHANGE DETAILS
49F recently dx w/ cholelithiasis w/ recurrent epigastric abd pain. labs wnl.    dispo: pending cta a/p

## 2019-12-16 NOTE — ED PROVIDER NOTE - CLINICAL SUMMARY MEDICAL DECISION MAKING FREE TEXT BOX
49F PMH tubal ligation, thyroidectomy (2/2 nodules, now on synthroid) p/w abd pain. 4d ago pt developed generalized weakness and feeling cold/clammy, those symptoms have since resolved. 3d ago developed RUQ pain and abd bloating and nausea, came to ED 2d ago where UA/labs grossly wnl and US showing gallstones, received IVF/meds w/ improvement and dc'd (offered CT at that time). Returns today w/ worsening of pain. Pain is now b/l upper abd, constant, non-radiating, no exacerbating/alleviating factors. Also w/ persistent nausea, decreased PO intake. Normal flatus, last BM ~1500. Subjective fevers a few days ago, none now. Has chronic unchanged black stool 2/2 iron use. No other systemic smyptoms. Vitals wnl, exam as above.  ddx: Likely GERD/gastritis/PUD vs. less likely leisa or abd clot or atypical ACS.  cbc, cmp, trop, lipase, lactate, CTA, IVF, symptom control, reassess. 49F PMH tubal ligation, thyroidectomy (2/2 nodules, now on synthroid) p/w abd pain. 4d ago pt developed generalized weakness and feeling cold/clammy, those symptoms have since resolved. 3d ago developed RUQ pain and abd bloating and nausea, came to ED 2d ago where UA/labs grossly wnl and US showing gallstones, received IVF/meds w/ improvement and dc'd (offered CT at that time). Returns today w/ worsening of pain. Pain is now b/l upper abd, constant, non-radiating, no exacerbating/alleviating factors. Also w/ persistent nausea, decreased PO intake. Normal flatus, last BM ~1500. Subjective fevers a few days ago, none now. Has chronic unchanged black stool 2/2 iron use. No other systemic smyptoms. Vitals wnl, exam as above.  ddx: Likely GERD/gastritis/PUD vs. less likely leisa or abd clot or atypical ACS.  cbc, cmp, trop, lipase, lactate, CTA, IVF, symptom control, reassess.    Patient feeling better after Reglan. Results were discussed with patient and Reglan was rx. Recommend follow up with pMD.

## 2019-12-16 NOTE — ED PROVIDER NOTE - PATIENT PORTAL LINK FT
You can access the FollowMyHealth Patient Portal offered by [facility] by registering at the following website: [email or web address]. By joining [employer]’s FollowMyHealth portal, you will also be able to view your health information using other applications (apps) compatible with our system.

## 2019-12-16 NOTE — ED PROVIDER NOTE - NSFOLLOWUPINSTRUCTIONS_ED_ALL_ED_FT
EnglishSpanish    Abdominal Pain, Adult     Many things can cause belly (abdominal) pain. Most times, belly pain is not dangerous. Many cases of belly pain can be watched and treated at home. Sometimes belly pain is serious, though. Your doctor will try to find the cause of your belly pain.  Follow these instructions at home:  Take over-the-counter and prescription medicines only as told by your doctor. Do not take medicines that help you poop (laxatives) unless told to by your doctor.Drink enough fluid to keep your pee (urine) clear or pale yellow.Watch your belly pain for any changes.Keep all follow-up visits as told by your doctor. This is important.Contact a doctor if:  Your belly pain changes or gets worse.You are not hungry, or you lose weight without trying.You are having trouble pooping (constipated) or have watery poop (diarrhea) for more than 2–3 days.You have pain when you pee or poop.Your belly pain wakes you up at night.Your pain gets worse with meals, after eating, or with certain foods.You are throwing up and cannot keep anything down.You have a fever.Get help right away if:  Your pain does not go away as soon as your doctor says it should.You cannot stop throwing up.Your pain is only in areas of your belly, such as the right side or the left lower part of the belly.You have bloody or black poop, or poop that looks like tar.You have very bad pain, cramping, or bloating in your belly.You have signs of not having enough fluid or water in your body (dehydration), such as:  Dark pee, very little pee, or no pee.Cracked lips.Dry mouth.Sunken eyes.Sleepiness.Weakness.This information is not intended to replace advice given to you by your health care provider. Make sure you discuss any questions you have with your health care provider.    Document Released: 06/05/2009 Document Revised: 07/07/2017 Document Reviewed: 05/31/2017  [email or web address] Interactive Patient Education © 2019 [email or web address]

## 2019-12-16 NOTE — ED PROVIDER NOTE - PROGRESS NOTE DETAILS
[place]: still in pain/nausea but improving. EKG w/ non-specific flattening. Labs grossly wnl. Does not want more pain meds at this time. Awaiting CTA, reassessment. updated pt.

## 2019-12-16 NOTE — ED PROVIDER NOTE - OBJECTIVE STATEMENT
49F PMH tubal ligation, thyroidectomy (2/2 nodules, now on synthroid) p/w abd pain. 4d ago pt developed generalized weakness and feeling cold/clammy, those symptoms have since resolved. 3d ago developed RUQ pain and abd bloating and nausea, came to ED 2d ago where UA/labs grossly wnl and US showing gallstones, received IVF/meds w/ improvement and dc'd (offered CT at that time). Returns today w/ worsening of pain. Pain is now b/l upper abd, constant, non-radiating, no exacerbating/alleviating factors. Also w/ persistent nausea, decreased PO intake. Normal flatus, last BM ~1500. Subjective fevers a few days ago, none now. Has chronic unchanged black stool 2/2 iron use. Denies vomit, diarrhea, bloody stool, urinary complaints, focal weakness/numbness, lightheadedness, back pain, rashes, recent travel, recent antibiotics, sick contacts, SOB/CP, URI symptoms. Normal BMs.

## 2019-12-17 VITALS
OXYGEN SATURATION: 99 % | DIASTOLIC BLOOD PRESSURE: 80 MMHG | SYSTOLIC BLOOD PRESSURE: 143 MMHG | HEART RATE: 75 BPM | TEMPERATURE: 98 F | RESPIRATION RATE: 16 BRPM

## 2019-12-17 PROBLEM — E03.9 HYPOTHYROIDISM, UNSPECIFIED: Chronic | Status: ACTIVE | Noted: 2019-12-15

## 2019-12-17 PROCEDURE — 74174 CTA ABD&PLVS W/CONTRAST: CPT

## 2019-12-17 PROCEDURE — 85025 COMPLETE CBC W/AUTO DIFF WBC: CPT

## 2019-12-17 PROCEDURE — 96376 TX/PRO/DX INJ SAME DRUG ADON: CPT | Mod: XU

## 2019-12-17 PROCEDURE — 83605 ASSAY OF LACTIC ACID: CPT

## 2019-12-17 PROCEDURE — 83690 ASSAY OF LIPASE: CPT

## 2019-12-17 PROCEDURE — 36415 COLL VENOUS BLD VENIPUNCTURE: CPT

## 2019-12-17 PROCEDURE — 99284 EMERGENCY DEPT VISIT MOD MDM: CPT | Mod: 25

## 2019-12-17 PROCEDURE — 84484 ASSAY OF TROPONIN QUANT: CPT

## 2019-12-17 PROCEDURE — 84702 CHORIONIC GONADOTROPIN TEST: CPT

## 2019-12-17 PROCEDURE — 93005 ELECTROCARDIOGRAM TRACING: CPT

## 2019-12-17 PROCEDURE — 96374 THER/PROPH/DIAG INJ IV PUSH: CPT | Mod: XU

## 2019-12-17 PROCEDURE — 80053 COMPREHEN METABOLIC PANEL: CPT

## 2019-12-17 PROCEDURE — 96375 TX/PRO/DX INJ NEW DRUG ADDON: CPT

## 2019-12-17 RX ORDER — METOCLOPRAMIDE HCL 10 MG
1 TABLET ORAL
Qty: 42 | Refills: 0
Start: 2019-12-17 | End: 2019-12-30

## 2019-12-17 RX ORDER — METOCLOPRAMIDE HCL 10 MG
10 TABLET ORAL ONCE
Refills: 0 | Status: COMPLETED | OUTPATIENT
Start: 2019-12-17 | End: 2019-12-17

## 2019-12-17 RX ADMIN — Medication 10 MILLIGRAM(S): at 01:28

## 2019-12-24 DIAGNOSIS — R10.11 RIGHT UPPER QUADRANT PAIN: ICD-10-CM

## 2019-12-24 DIAGNOSIS — Z79.899 OTHER LONG TERM (CURRENT) DRUG THERAPY: ICD-10-CM

## 2019-12-24 DIAGNOSIS — Z91.013 ALLERGY TO SEAFOOD: ICD-10-CM

## 2019-12-24 DIAGNOSIS — Z90.89 ACQUIRED ABSENCE OF OTHER ORGANS: ICD-10-CM

## 2019-12-24 DIAGNOSIS — R11.0 NAUSEA: ICD-10-CM

## 2019-12-24 DIAGNOSIS — Z88.1 ALLERGY STATUS TO OTHER ANTIBIOTIC AGENTS STATUS: ICD-10-CM

## 2020-04-25 ENCOUNTER — MESSAGE (OUTPATIENT)
Age: 50
End: 2020-04-25

## 2020-05-01 LAB
SARS-COV-2 IGG SERPL IA-ACNC: <0.1 INDEX
SARS-COV-2 IGG SERPL QL IA: NEGATIVE

## 2020-05-21 ENCOUNTER — EMERGENCY (EMERGENCY)
Facility: HOSPITAL | Age: 50
LOS: 1 days | Discharge: HOME | End: 2020-05-21
Attending: EMERGENCY MEDICINE | Admitting: EMERGENCY MEDICINE
Payer: COMMERCIAL

## 2020-05-21 VITALS
WEIGHT: 179.9 LBS | OXYGEN SATURATION: 97 % | HEART RATE: 73 BPM | DIASTOLIC BLOOD PRESSURE: 76 MMHG | HEIGHT: 63.5 IN | SYSTOLIC BLOOD PRESSURE: 158 MMHG | RESPIRATION RATE: 24 BRPM | TEMPERATURE: 98 F

## 2020-05-21 VITALS
SYSTOLIC BLOOD PRESSURE: 125 MMHG | HEART RATE: 61 BPM | TEMPERATURE: 98 F | DIASTOLIC BLOOD PRESSURE: 54 MMHG | OXYGEN SATURATION: 97 % | RESPIRATION RATE: 18 BRPM

## 2020-05-21 DIAGNOSIS — Z90.09 ACQUIRED ABSENCE OF OTHER PART OF HEAD AND NECK: Chronic | ICD-10-CM

## 2020-05-21 DIAGNOSIS — Z98.51 TUBAL LIGATION STATUS: Chronic | ICD-10-CM

## 2020-05-21 LAB
ALBUMIN SERPL ELPH-MCNC: 4.1 G/DL — SIGNIFICANT CHANGE UP (ref 3.3–5)
ALP SERPL-CCNC: 95 U/L — SIGNIFICANT CHANGE UP (ref 40–120)
ALT FLD-CCNC: 19 U/L — SIGNIFICANT CHANGE UP (ref 10–45)
ANION GAP SERPL CALC-SCNC: 11 MMOL/L — SIGNIFICANT CHANGE UP (ref 5–17)
APPEARANCE UR: CLEAR — SIGNIFICANT CHANGE UP
APTT BLD: 26 SEC — LOW (ref 27.5–36.3)
AST SERPL-CCNC: 27 U/L — SIGNIFICANT CHANGE UP (ref 10–40)
BACTERIA # UR AUTO: PRESENT /HPF
BASOPHILS # BLD AUTO: 0.04 K/UL — SIGNIFICANT CHANGE UP (ref 0–0.2)
BASOPHILS NFR BLD AUTO: 0.3 % — SIGNIFICANT CHANGE UP (ref 0–2)
BILIRUB SERPL-MCNC: 0.3 MG/DL — SIGNIFICANT CHANGE UP (ref 0.2–1.2)
BILIRUB UR-MCNC: NEGATIVE — SIGNIFICANT CHANGE UP
BUN SERPL-MCNC: 12 MG/DL — SIGNIFICANT CHANGE UP (ref 7–23)
CALCIUM SERPL-MCNC: 9.1 MG/DL — SIGNIFICANT CHANGE UP (ref 8.4–10.5)
CHLORIDE SERPL-SCNC: 100 MMOL/L — SIGNIFICANT CHANGE UP (ref 96–108)
CO2 SERPL-SCNC: 25 MMOL/L — SIGNIFICANT CHANGE UP (ref 22–31)
COLOR SPEC: YELLOW — SIGNIFICANT CHANGE UP
CREAT SERPL-MCNC: 0.89 MG/DL — SIGNIFICANT CHANGE UP (ref 0.5–1.3)
DIFF PNL FLD: ABNORMAL
EOSINOPHIL # BLD AUTO: 0.48 K/UL — SIGNIFICANT CHANGE UP (ref 0–0.5)
EOSINOPHIL NFR BLD AUTO: 4.1 % — SIGNIFICANT CHANGE UP (ref 0–6)
EPI CELLS # UR: SIGNIFICANT CHANGE UP /HPF (ref 0–5)
GLUCOSE SERPL-MCNC: 158 MG/DL — HIGH (ref 70–99)
GLUCOSE UR QL: NEGATIVE — SIGNIFICANT CHANGE UP
HCT VFR BLD CALC: 36.1 % — SIGNIFICANT CHANGE UP (ref 34.5–45)
HGB BLD-MCNC: 11.5 G/DL — SIGNIFICANT CHANGE UP (ref 11.5–15.5)
IMM GRANULOCYTES NFR BLD AUTO: 0.3 % — SIGNIFICANT CHANGE UP (ref 0–1.5)
INR BLD: 1.03 — SIGNIFICANT CHANGE UP (ref 0.88–1.16)
KETONES UR-MCNC: NEGATIVE — SIGNIFICANT CHANGE UP
LEUKOCYTE ESTERASE UR-ACNC: NEGATIVE — SIGNIFICANT CHANGE UP
LIDOCAIN IGE QN: 27 U/L — SIGNIFICANT CHANGE UP (ref 7–60)
LYMPHOCYTES # BLD AUTO: 2.36 K/UL — SIGNIFICANT CHANGE UP (ref 1–3.3)
LYMPHOCYTES # BLD AUTO: 20 % — SIGNIFICANT CHANGE UP (ref 13–44)
MCHC RBC-ENTMCNC: 30.5 PG — SIGNIFICANT CHANGE UP (ref 27–34)
MCHC RBC-ENTMCNC: 31.9 GM/DL — LOW (ref 32–36)
MCV RBC AUTO: 95.8 FL — SIGNIFICANT CHANGE UP (ref 80–100)
MONOCYTES # BLD AUTO: 0.97 K/UL — HIGH (ref 0–0.9)
MONOCYTES NFR BLD AUTO: 8.2 % — SIGNIFICANT CHANGE UP (ref 2–14)
NEUTROPHILS # BLD AUTO: 7.92 K/UL — HIGH (ref 1.8–7.4)
NEUTROPHILS NFR BLD AUTO: 67.1 % — SIGNIFICANT CHANGE UP (ref 43–77)
NITRITE UR-MCNC: NEGATIVE — SIGNIFICANT CHANGE UP
NRBC # BLD: 0 /100 WBCS — SIGNIFICANT CHANGE UP (ref 0–0)
PH UR: 6.5 — SIGNIFICANT CHANGE UP (ref 5–8)
PLATELET # BLD AUTO: 394 K/UL — SIGNIFICANT CHANGE UP (ref 150–400)
POTASSIUM SERPL-MCNC: 4.1 MMOL/L — SIGNIFICANT CHANGE UP (ref 3.5–5.3)
POTASSIUM SERPL-SCNC: 4.1 MMOL/L — SIGNIFICANT CHANGE UP (ref 3.5–5.3)
PROT SERPL-MCNC: 7.6 G/DL — SIGNIFICANT CHANGE UP (ref 6–8.3)
PROT UR-MCNC: NEGATIVE MG/DL — SIGNIFICANT CHANGE UP
PROTHROM AB SERPL-ACNC: 11.7 SEC — SIGNIFICANT CHANGE UP (ref 10–12.9)
RBC # BLD: 3.77 M/UL — LOW (ref 3.8–5.2)
RBC # FLD: 13.2 % — SIGNIFICANT CHANGE UP (ref 10.3–14.5)
RBC CASTS # UR COMP ASSIST: ABNORMAL /HPF
SARS-COV-2 RNA SPEC QL NAA+PROBE: SIGNIFICANT CHANGE UP
SODIUM SERPL-SCNC: 136 MMOL/L — SIGNIFICANT CHANGE UP (ref 135–145)
SP GR SPEC: 1.01 — SIGNIFICANT CHANGE UP (ref 1–1.03)
UROBILINOGEN FLD QL: 0.2 E.U./DL — SIGNIFICANT CHANGE UP
WBC # BLD: 11.81 K/UL — HIGH (ref 3.8–10.5)
WBC # FLD AUTO: 11.81 K/UL — HIGH (ref 3.8–10.5)
WBC UR QL: < 5 /HPF — SIGNIFICANT CHANGE UP

## 2020-05-21 PROCEDURE — 76705 ECHO EXAM OF ABDOMEN: CPT

## 2020-05-21 PROCEDURE — 36415 COLL VENOUS BLD VENIPUNCTURE: CPT

## 2020-05-21 PROCEDURE — 84484 ASSAY OF TROPONIN QUANT: CPT

## 2020-05-21 PROCEDURE — 74177 CT ABD & PELVIS W/CONTRAST: CPT

## 2020-05-21 PROCEDURE — 71045 X-RAY EXAM CHEST 1 VIEW: CPT | Mod: 26,77

## 2020-05-21 PROCEDURE — 71045 X-RAY EXAM CHEST 1 VIEW: CPT

## 2020-05-21 PROCEDURE — 81001 URINALYSIS AUTO W/SCOPE: CPT

## 2020-05-21 PROCEDURE — 99285 EMERGENCY DEPT VISIT HI MDM: CPT | Mod: 25

## 2020-05-21 PROCEDURE — 93010 ELECTROCARDIOGRAM REPORT: CPT

## 2020-05-21 PROCEDURE — 99284 EMERGENCY DEPT VISIT MOD MDM: CPT | Mod: 25

## 2020-05-21 PROCEDURE — 87635 SARS-COV-2 COVID-19 AMP PRB: CPT

## 2020-05-21 PROCEDURE — 82553 CREATINE MB FRACTION: CPT

## 2020-05-21 PROCEDURE — 80053 COMPREHEN METABOLIC PANEL: CPT

## 2020-05-21 PROCEDURE — 96365 THER/PROPH/DIAG IV INF INIT: CPT | Mod: XU

## 2020-05-21 PROCEDURE — 85610 PROTHROMBIN TIME: CPT

## 2020-05-21 PROCEDURE — 96375 TX/PRO/DX INJ NEW DRUG ADDON: CPT

## 2020-05-21 PROCEDURE — U0003: CPT

## 2020-05-21 PROCEDURE — 76705 ECHO EXAM OF ABDOMEN: CPT | Mod: 26

## 2020-05-21 PROCEDURE — 71045 X-RAY EXAM CHEST 1 VIEW: CPT | Mod: 26

## 2020-05-21 PROCEDURE — 82550 ASSAY OF CK (CPK): CPT

## 2020-05-21 PROCEDURE — 93005 ELECTROCARDIOGRAM TRACING: CPT

## 2020-05-21 PROCEDURE — 83690 ASSAY OF LIPASE: CPT

## 2020-05-21 PROCEDURE — 74177 CT ABD & PELVIS W/CONTRAST: CPT | Mod: 26

## 2020-05-21 PROCEDURE — 85025 COMPLETE CBC W/AUTO DIFF WBC: CPT

## 2020-05-21 PROCEDURE — 85730 THROMBOPLASTIN TIME PARTIAL: CPT

## 2020-05-21 PROCEDURE — 87086 URINE CULTURE/COLONY COUNT: CPT

## 2020-05-21 RX ORDER — CEFTRIAXONE 500 MG/1
1000 INJECTION, POWDER, FOR SOLUTION INTRAMUSCULAR; INTRAVENOUS ONCE
Refills: 0 | Status: COMPLETED | OUTPATIENT
Start: 2020-05-21 | End: 2020-05-21

## 2020-05-21 RX ORDER — ONDANSETRON 8 MG/1
4 TABLET, FILM COATED ORAL ONCE
Refills: 0 | Status: COMPLETED | OUTPATIENT
Start: 2020-05-21 | End: 2020-05-21

## 2020-05-21 RX ORDER — SODIUM CHLORIDE 9 MG/ML
1000 INJECTION INTRAMUSCULAR; INTRAVENOUS; SUBCUTANEOUS ONCE
Refills: 0 | Status: COMPLETED | OUTPATIENT
Start: 2020-05-21 | End: 2020-05-21

## 2020-05-21 RX ORDER — METRONIDAZOLE 500 MG
500 TABLET ORAL ONCE
Refills: 0 | Status: COMPLETED | OUTPATIENT
Start: 2020-05-21 | End: 2020-05-21

## 2020-05-21 RX ORDER — IOHEXOL 300 MG/ML
30 INJECTION, SOLUTION INTRAVENOUS ONCE
Refills: 0 | Status: COMPLETED | OUTPATIENT
Start: 2020-05-21 | End: 2020-05-21

## 2020-05-21 RX ORDER — FAMOTIDINE 10 MG/ML
20 INJECTION INTRAVENOUS ONCE
Refills: 0 | Status: COMPLETED | OUTPATIENT
Start: 2020-05-21 | End: 2020-05-21

## 2020-05-21 RX ORDER — MORPHINE SULFATE 50 MG/1
4 CAPSULE, EXTENDED RELEASE ORAL ONCE
Refills: 0 | Status: DISCONTINUED | OUTPATIENT
Start: 2020-05-21 | End: 2020-05-21

## 2020-05-21 RX ADMIN — CEFTRIAXONE 100 MILLIGRAM(S): 500 INJECTION, POWDER, FOR SOLUTION INTRAMUSCULAR; INTRAVENOUS at 21:35

## 2020-05-21 RX ADMIN — SODIUM CHLORIDE 1000 MILLILITER(S): 9 INJECTION INTRAMUSCULAR; INTRAVENOUS; SUBCUTANEOUS at 17:17

## 2020-05-21 RX ADMIN — IOHEXOL 30 MILLILITER(S): 300 INJECTION, SOLUTION INTRAVENOUS at 19:01

## 2020-05-21 RX ADMIN — MORPHINE SULFATE 4 MILLIGRAM(S): 50 CAPSULE, EXTENDED RELEASE ORAL at 17:16

## 2020-05-21 RX ADMIN — CEFTRIAXONE 1000 MILLIGRAM(S): 500 INJECTION, POWDER, FOR SOLUTION INTRAMUSCULAR; INTRAVENOUS at 22:10

## 2020-05-21 RX ADMIN — Medication 100 MILLIGRAM(S): at 22:10

## 2020-05-21 RX ADMIN — FAMOTIDINE 20 MILLIGRAM(S): 10 INJECTION INTRAVENOUS at 17:16

## 2020-05-21 RX ADMIN — ONDANSETRON 4 MILLIGRAM(S): 8 TABLET, FILM COATED ORAL at 17:17

## 2020-05-21 NOTE — ED PROVIDER NOTE - CPE EDP HEME LYMPH NORM
Chief Complaint   Patient presents with   • Follow-up     Hypogonadism            Pt is a very pleasant 66 yo here for follow up of hypogonadism.     Current symptoms: feels good, depression improved    Patient denies:  changes in strength, changes in muscle mass, difficulty concentrating, memory loss, changes in abilities to perform daily activities, infertility and low bone density  He notes improve   Current comorbid conditions:  Atrial flutter dvt, TRACY  Interim history no change no recent hospitalization  Wife retired is doing well.      HISTORIES:  Patient Active Problem List   Diagnosis   • ATRIAL FLUTTER / FIBRILLATION S/P AVJ ABLATION AND PACER   • Migraine with aura, without mention of intractable migraine without mention of status migrainosus   • Malignant neoplasm of esophagus, unspecified site   • Mechanical complication of other vascular device, implant, and graft   • DEEP VEIN THROMBOSIS (DVT)-R arm   • Other malaise and fatigue   • Depression   • TRACY (generalized anxiety disorder)   • Medtronic dual pacemaker: implanted 11/4/2009 (programmed VVIR)   • Typical atrial flutter (CMS/HCC)   • Hyperlipidemia   • Esophageal cancer (CMS/HCC)   • Anticoagulated on Coumadin   • CHB (complete heart block) (CMS/HCC)   • Hypogonadism in male   • Screening for malignant neoplasm of prostate        Past Surgical History:   Procedure Laterality Date   • Cardiac catherization     • Cardiac surgery     • Colonoscopy diagnostic  6/23/2015    Colon in 5 years,diverticulosis,Dr. Staton   • Colonoscopy remove lesion by snare  10/29/10    rpt 3 yrs,adenoma x2,Dr. Staton.    • Colonoscopy w biopsy  03/27/06    adenomatous polyp, mucosal tag/rpt 3 yrs Dr. Staton   • Cystoscopy,insert ureteral stent  4/23/09    Dr. Devlin/[facility]   • Cystourethroscopy  5/1/09    Stent removal- [facility]   • Cystourethroscopy /lithotripsy  4/23/09    Dr. 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 Staton   • Esophagogastroduodenoscopy transoral flex w/bx single or mult  06/23/15    Anastomotic site biopsy>gastritis,neg hpylori,Dr. Staton   • Esophagogastroduodenoscopy transoral flex w/bx single or mult  2018    rpt 3 yrs,Anastomotic site intact,esophagitis,Gastritis,Brionna   • Esophagogastroduodenoscopy w/endoscopic us eso stomach duod  11/05/10    Eso CA. Dr. Staton.    • Esophagogastroduodenoscopy w/endoscopic us eso stomach duod  11    Eso CA. Dr. Staton.    • Esophagus surgery N/A     DANNY Fragoso MD   • Fracture surgery     • Inser lopez pacer epicard w thoract      Pacemaker   • Knee scope,diagnostic      R knee arthroscopy   • Pacemaker monitor  2003   • Past surgical history      s/p cardiac pacemaker for atrial fib   • Skin lesion excision  2018    excision of scalp cyst - Dr Trujillo       Family History   Problem Relation Age of Onset   • Cancer Father         prostate but cancer free at age 81   • Hearing Loss Father    • Heart Mother         atrial fibrillation   • Macular degeneration Mother    • Anxiety disorder Daughter    • Stroke Paternal Grandmother    • Hypertension Paternal Grandmother    • Arthritis Paternal Grandmother    • Stroke Paternal Grandfather    • Hypertension Paternal Grandfather    • Cancer Maternal Grandfather         lung   • Cancer Maternal Uncle         2 uncles had lung cancer   • Cancer Paternal Uncle         lung   • Anxiety disorder Daughter        Social History     Tobacco Use   • Smoking status: Former Smoker     Packs/day: 1.50     Years: 14.00     Pack years: 21.00     Types: Cigarettes     Start date: 1970     Last attempt to quit: 1984     Years since quittin.5   • Smokeless tobacco: Never Used   Substance Use Topics   • Alcohol use: No     Comment: Has had one drink in 45 years. Plans to  have one a year.   • Drug use: No        EMPLOYER:      Allergies as of 07/15/2019 - Reviewed 07/15/2019   Allergen Reaction Noted   • Augmentin es-600 [amoclan] SHORTNESS OF BREATH 03/13/2006   • Lamotrigine Other (See Comments) 07/09/2012   • Nitrates, organic   (food or med) HEADACHES 11/05/2010        LABORATORY DATA:  TOTAL TESTOSTERONE (ng/dL)   Date Value   11/14/2017 230 (L)   03/15/2007 271     Testosterone Male (ng/dL)   Date Value   07/13/2019 1,002.2   01/08/2019 806.2     PSA, Total   Date Value   01/08/2019 1.91 ng/mL   12/21/2017 1.77 ng/mL   02/19/2001 0.9 NG/ML     Lab Results   Component Value Date    WBC 5.8 06/22/2019    WBC 5.8 05/26/2018    HCT 49.8 07/13/2019    HCT 47.3 06/22/2019    HGB 15.7 07/13/2019    HGB 15.3 06/22/2019     06/22/2019     05/26/2018           Constitutional Problem(s):  Negative  Eye Problem(s):  Negative  ENT Problem(s):  Negative  Respiratory problem(s):  Negative  Cardiovascular problem(s):  Negative  Hematologic problem(s):  Negative  Immunologic Problem(s):  Negative  Gastrointestinal problem(s):  Negative  Genitourinary problem(s):  Negative  Musculoskeletal problem(s):  Negative  Neurological problem(s):  Negative  Endocrine problem(s):  Negative  Skin problem(s):  Negative    Visit Vitals  /70   Pulse 86   Resp 14   Ht 5' 10\" (1.778 m)   Wt 108 kg   SpO2 96%   BMI 34.15 kg/m²       PHYSICAL EXAM:  General: alert, in no acute distress  Skin: warm with normal turgor  Head: atraumatic and normocephalic  Nose: no flaring or discharge present  Throat: oropharynx clear, no redness or exudate present  Neck: supple with no significant adenopathy, no thyromegaly  Lungs: clear to auscultation, no wheezing or rhonchi noted  Heart: regular rhythm, no murmur present  Abdomen: soft, no guarding or masses, no organomegaly or CVA tenderness    E29.1 Hypogonadism in male  (primary encounter diagnosis)        Discussed side effects of medication including risk  of prostate cancer, polycythemia, gynecomastia, testicular atrophy, suppression of spermatogenesis.  An extended discussion of the above diagnosis, workup as well as the risk/benefits of the treatment plan was conducted with the patient, who verbalized understanding.  All questions were answered.     At this time labs are looking good, pt feels great on replacement  Will continue the same dose of testosterone    Labs every 6 monthly  Yearly follow up to clinic  Refills will be placed.      Orders Placed This Encounter   • testosterone 20.25 MG/ACT (1.62%) gel       Cc note Dr Kee Earl.       normal...

## 2020-05-21 NOTE — ED PROVIDER NOTE - OBJECTIVE STATEMENT
51 yo F with hx of gallstones with RUQ pain x 4-5 hrs - unrelated to eating -slight nausea- no vomiting or diarrhea- no urinary sx or flank pain-  no cough or sob -no covid contacts--no prior hx of known covid  no DM no hx of CAD or MI  no smoking hx

## 2020-05-21 NOTE — ED PROVIDER NOTE - NSFOLLOWUPINSTRUCTIONS_ED_ALL_ED_FT
Biliary Colic    WHAT YOU NEED TO KNOW:    What is biliary colic? Biliary colic is severe pain in your upper abdomen caused by a gallbladder problem. Your gallbladder stores bile, which helps break down the fats that you eat. Gallbladder         What causes biliary colic? Biliary colic happens when something blocks the duct that moves bile out of the gallbladder. Any of the following can cause blockage:     Gallstones      Swelling of the gallbladder      Narrow bile duct      Injury      Pancreatitis (inflammation of pancreas)      Duodenitis (inflammation of small intestine)      Spasms in the esophagus    What increases my risk for biliary colic?     Family history of biliary colic      Certain medicines, such as birth control      Older age      Pregnancy      Obesity      Medical conditions, such as liver cirrhosis or hemolytic disease    What are the signs and symptoms of biliary colic?     Pain in the middle of your upper abdomen, just below your breast bone      Pain that is worse on your right side, just below your ribs      Pain in your back, just under your shoulder blade      Nausea and vomiting      Pain after you eat a meal    How is biliary colic diagnosed?     Blood tests check for what is causing your biliary colic.      Urine tests check gallbladder function.      An x-ray of your abdomen may help determine what is causing your biliary colic.      An ultrasound uses sound waves to show pictures on a monitor. An ultrasound may be done to show blockages or other causes of your pain.      A CT scan, or CAT scan, is a type of x-ray that uses a computer takes pictures of your abdomen. The pictures may show a blockage or other causes of your pain. You may be given a dye before the pictures are taken to help healthcare providers see the pictures better. Tell the healthcare provider if you have ever had an allergic reaction to contrast dye.      An MRI is a scan that uses powerful magnets and a computer to take pictures of your biliary system. An MRI may show blockages or growths. You may be given dye to help the pictures show up better. Tell the healthcare provider if you have ever had an allergic reaction to contrast dye. Do not enter the MRI room with anything metal. Metal can cause serious injury. Tell the healthcare provider if you have any metal in or on your body.    How is biliary colic treated?     Medicines can help decrease pain and muscle spasms. You may also need medicine to calm your stomach and stop vomiting.      Procedures may be needed to remove blockages or widen your bile duct. Ask your healthcare provider for more information about any procedures you may need.    How can I manage my symptoms?     Avoid alcohol. Alcohol can damage your gallbladder and make your symptoms worse.      Maintain a healthy weight. Ask your healthcare provider how much you should weigh. Ask him to help you create a weight loss plan if you are overweight.       Eat a variety of healthy foods. Healthy foods include fruits, vegetables, whole-grain breads, low-fat dairy products, beans, lean meats, and fish. Foods that are high in fiber and low in fat and cholesterol may decrease your symptoms. Ask if you need to be on a special diet.      Exercise. Ask your healthcare provider about the best exercise plan for you. Exercise may help improve your symptoms.    When should I contact my healthcare provider?     You have a fever.      Your pain gets worse, even after you take medicine.      You have nausea or are vomiting.      Your skin or eyes are yellow.      You have questions or concerns about your condition or care.    When should I seek immediate care or call 911?     You have severe pain.      You feel like you are going to faint.      You are short of breath.     CARE AGREEMENT:    You have the right to help plan your care. Learn about your health condition and how it may be treated. Discuss treatment options with your healthcare providers to decide what care you want to receive. You always have the right to refuse treatment.        © Copyright [employer] 2020       back to top                      © Copyright [employer] 2020

## 2020-05-21 NOTE — ED ADULT NURSE NOTE - OBJECTIVE STATEMENT
50 y.o F a&ox4 Walked in from triage c.o abdominal pain. employee at [employer]. RUQ abdominal pain started x 1 hour prior to arrival. PMH of gallstones. denies fevers, chills, SOB, CP, dizizness, n/v/d, numbness, tingling, weakness.

## 2020-05-21 NOTE — ED PROVIDER NOTE - PATIENT PORTAL LINK FT
You can access the FollowMyHealth Patient Portal offered by [facility] by registering at the following website: [email or web address]. By joining [employer]’s FollowMyHealth portal, you will also be able to view your health information using other applications (apps) compatible with our system.

## 2020-05-21 NOTE — CONSULT NOTE ADULT - SUBJECTIVE AND OBJECTIVE BOX
HPI:  This is a 51 y/o F with PMH of benign thyroid nodule s/p total thyroidectomy, presents to the ED for RUQ pain   Patient has had similar pain for the last 2 years, on/off every 3-6 months she has known gallstones.   This episode started today after breakfast, which was slightly heavier than usual.    Pain did not go away and she was jalil to the ED for w/u     PAST MEDICAL & SURGICAL HISTORY:  Hypothyroid  H/O tubal ligation  H/O thyroidectomy      MEDICATIONS  (STANDING):    MEDICATIONS  (PRN):      Allergies    erythromycin (Vomiting)  Shrimp (Unknown)    Intolerances        SOCIAL HISTORY: non smoker     FAMILY HISTORY: non hx of IBD or colorectal cancer       Vital Signs Last 24 Hrs  T(C): 36.7 (21 May 2020 21:35), Max: 36.9 (21 May 2020 16:41)  T(F): 98.1 (21 May 2020 21:35), Max: 98.5 (21 May 2020 16:41)  HR: 68 (21 May 2020 21:35) (68 - 73)  BP: 145/68 (21 May 2020 21:35) (143/75 - 158/76)  BP(mean): --  RR: 18 (21 May 2020 21:35) (18 - 24)  SpO2: 97% (21 May 2020 21:35) (97% - 99%)    PHYSICAL EXAM  Neuro: awake and alert, no focal deficit   HEENT: normocephalic, no scleral ictera   Pulm: non labored breathing on room air, symmetric chest expansion   CV: regular rate and rhythm, normal peripheral perfusion   GI: abdomen is soft, tender in RUQ with clinical hudson, no hepatomegaly   : no CVA tenderness, no inguina tenderness  MSK: no swelling, no deformity       LABS:                        11.5   11.81 )-----------( 394      ( 21 May 2020 17:24 )             36.1     05-21    136  |  100  |  12  ----------------------------<  158<H>  4.1   |  25  |  0.89    Ca    9.1      21 May 2020 17:24    TPro  7.6  /  Alb  4.1  /  TBili  0.3  /  DBili  x   /  AST  27  /  ALT  19  /  AlkPhos  95  05-21    PT/INR - ( 21 May 2020 17:24 )   PT: 11.7 sec;   INR: 1.03          PTT - ( 21 May 2020 17:24 )  PTT:26.0 sec  Urinalysis Basic - ( 21 May 2020 19:12 )    Color: Yellow / Appearance: Clear / S.010 / pH: x  Gluc: x / Ketone: NEGATIVE  / Bili: Negative / Urobili: 0.2 E.U./dL   Blood: x / Protein: NEGATIVE mg/dL / Nitrite: NEGATIVE   Leuk Esterase: NEGATIVE / RBC: 5-10 /HPF / WBC < 5 /HPF   Sq Epi: x / Non Sq Epi: 0-5 /HPF / Bacteria: Present /HPF        RADIOLOGY & ADDITIONAL STUDIES:

## 2020-05-21 NOTE — CONSULT NOTE ADULT - ASSESSMENT
This is a 49 y/o F with PMH of benign thyroid nodule s/p total thyroidectomy, presents to the ED for RUQ pain  Found to have symptomatic cholelithiasis   Afebrile, hemodynamically stable, WBC 11.8  RUQ US without signs of cholecystitis, no sonographic hudson     Plan:  - Can be discharged from the ED   - No need for antibiotics  - Follow up with Dr Mxa in the office to discuss elective cholecystectomy   - Seen and examined with Dr Max in the ED

## 2020-05-21 NOTE — ED PROVIDER NOTE - CLINICAL SUMMARY MEDICAL DECISION MAKING FREE TEXT BOX
seen by dr jeniffer ramos to dc  sx cholethiasis  [facility] to see him next week in office 51 yo F with sx cholethiasis-- no obv signs to suggest acute choly per dr swift--seen by dr swift ok to dc -- sx cholethiasis  [facility]- percocet--she is to see him next week in office- return precautions  dw pt including increased pain N/V

## 2020-05-21 NOTE — ED PROVIDER NOTE - CARE PROVIDER_API CALL
Joseph Max  SURGERY  [address] [place] [id number]  Phone: [phone]  Fax: [phone]  Follow Up Time: 1-3 Days

## 2020-05-22 LAB — SARS-COV-2 RNA SPEC QL NAA+PROBE: SIGNIFICANT CHANGE UP

## 2020-05-23 LAB
CULTURE RESULTS: SIGNIFICANT CHANGE UP
SPECIMEN SOURCE: SIGNIFICANT CHANGE UP

## 2020-05-25 DIAGNOSIS — K80.50 CALCULUS OF BILE DUCT WITHOUT CHOLANGITIS OR CHOLECYSTITIS WITHOUT OBSTRUCTION: ICD-10-CM

## 2020-05-25 DIAGNOSIS — Z88.1 ALLERGY STATUS TO OTHER ANTIBIOTIC AGENTS STATUS: ICD-10-CM

## 2020-05-25 DIAGNOSIS — Z91.013 ALLERGY TO SEAFOOD: ICD-10-CM

## 2020-05-25 DIAGNOSIS — E03.9 HYPOTHYROIDISM, UNSPECIFIED: ICD-10-CM

## 2020-05-25 DIAGNOSIS — R10.11 RIGHT UPPER QUADRANT PAIN: ICD-10-CM

## 2020-05-25 DIAGNOSIS — Z11.59 ENCOUNTER FOR SCREENING FOR OTHER VIRAL DISEASES: ICD-10-CM

## 2020-08-11 ENCOUNTER — APPOINTMENT (OUTPATIENT)
Dept: OPHTHALMOLOGY | Facility: CLINIC | Age: 50
End: 2020-08-11

## 2020-08-14 ENCOUNTER — APPOINTMENT (OUTPATIENT)
Dept: OPHTHALMOLOGY | Facility: CLINIC | Age: 50
End: 2020-08-14
Payer: COMMERCIAL

## 2020-08-14 ENCOUNTER — NON-APPOINTMENT (OUTPATIENT)
Age: 50
End: 2020-08-14

## 2020-08-14 PROCEDURE — 92133 CPTRZD OPH DX IMG PST SGM ON: CPT

## 2020-08-14 PROCEDURE — 92004 COMPRE OPH EXAM NEW PT 1/>: CPT

## 2020-08-14 PROCEDURE — 92083 EXTENDED VISUAL FIELD XM: CPT

## 2020-10-09 ENCOUNTER — APPOINTMENT (OUTPATIENT)
Dept: OBGYN | Facility: CLINIC | Age: 50
End: 2020-10-09

## 2022-06-02 NOTE — ED PROVIDER NOTE - PRO INTERPRETER NEED 2
Mother said [facility] never got the cream recently sent by Dr Obdulia Chavez   Asking if it can be resent
proctocort resent to pharmacy
English

## 2022-09-22 NOTE — ED PROVIDER NOTE - WEIGHT BEARING
HEMATOLOGY ONCOLOGY FELLOW CLINIC    HISTORY OF PRESENT ILLNESS:      Ms. Anusha Andrew is a 27 year old female with hypertension, type I diabetes mellitus, hyperlipidemia, and history of recent gastric sleeve who presents for follow up for superior mesenteric vein thrombus.     Ms. Andrew underwent gastric sleeve on 7/11/22, then presented to the ED on 8/11/22 with abdominal pain and nausea. She was found to have SMV thrombosis with reactive mesenteric edema and venous congestion. She was admitted and started on heparin gtt. Hematology was consulted and recommended transition to apixaban at that time. She denied any personal or family history of blood clots. No travel. She was taking oral estrogen/progesterone contraceptive at that time. She is now on progesterone only contraception.     Workup for hypercoagulable state has been negative so far, with negative antiphospholipid antibody, lupus anticoagulant, and beta 2 glycoprotein. Negative JAK2, negative PNH clones, no prothrombin gene mutation, negative Factor V Leiden    She is tolerating apixaban well with no significant bleeding. Abdominal pain has resolved since hospitalization.        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 type 1 diabetes mellitus 3/30/2021    Type I (juvenile type) diabetes mellitus without mention of complication, uncontrolled 11/23/2011       Family History   Problem Relation Age of Onset    Diabetes Brother     No Known Problems Mother     No Known Problems Father     Hydrocephalus Daughter     No Known Problems Brother     No Known Problems Brother     No Known Problems Maternal Aunt     No Known Problems Maternal Uncle     No Known Problems Paternal Aunt     No Known Problems Paternal Uncle     No Known Problems Maternal Grandmother     No Known Problems Maternal Grandfather     No Known  Problems Paternal Grandmother     No Known Problems Paternal Grandfather     Amblyopia Neg Hx     Blindness Neg Hx     Cataracts Neg Hx     Glaucoma Neg Hx     Hypertension Neg Hx     Macular degeneration Neg Hx     Retinal detachment Neg Hx     Strabismus Neg Hx     Stroke Neg Hx     Thyroid disease Neg Hx     Breast cancer Neg Hx     Colon cancer Neg Hx     Ovarian cancer Neg Hx     Cancer Neg Hx        Social History     Socioeconomic History    Marital status: Single   Tobacco Use    Smoking status: Never    Smokeless tobacco: Never   Substance and Sexual Activity    Alcohol use: Yes     Comment: occasionally    Drug use: No    Sexual activity: Yes     Partners: Male     Birth control/protection: Injection     Social Determinants of Health     Financial Resource Strain: Low Risk     Difficulty of Paying Living Expenses: Not hard at all   Food Insecurity: No Food Insecurity    Worried About Running Out of Food in the Last Year: Never true    Ran Out of Food in the Last Year: Never true   Transportation Needs: No Transportation Needs    Lack of Transportation (Medical): No    Lack of Transportation (Non-Medical): No   Physical Activity: Sufficiently Active    Days of Exercise per Week: 4 days    Minutes of Exercise per Session: 60 min   Stress: No Stress Concern Present    Feeling of Stress : Only a little   Social Connections: Unknown    Frequency of Communication with Friends and Family: More than three times a week    Frequency of Social Gatherings with Friends and Family: Once a week    Active Member of Clubs or Organizations: No    Attends Club or Organization Meetings: Never    Marital Status: Never    Housing Stability: Low Risk     Unable to Pay for Housing in the Last Year: No    Number of Places Lived in the Last Year: 1    Unstable Housing in the Last Year: No         MEDICATIONS:     Current Outpatient Medications on File Prior to Visit   Medication Sig Dispense Refill    apixaban (ELIQUIS) 5 mg  "Tab Take 1 tablet (5 mg total) by mouth 2 (two) times daily. Take SECOND. 180 tablet 0    blood sugar diagnostic Strp To check BG 4  times daily, to use with insurance preferred meter, e 10.65 (Patient taking differently: To check BG 4  times daily, to use with insurance preferred meter, e 10.65) 400 each 3    ELINEST 0.3-30 mg-mcg per tablet       ergocalciferol (VITAMIN D2) 50,000 unit Cap Take 1 capsule (50,000 Units total) by mouth every 7 days. 5 capsule 3    hydrocortisone (ANUSOL-HC) 2.5 % rectal cream Place rectally 2 (two) times daily. 28 g 2    insulin (LANTUS SOLOSTAR U-100 INSULIN) glargine 100 units/mL SubQ pen Inject 16-22 units at night. (medication will last 136 days) 30 mL 3    insulin aspart U-100 (NOVOLOG FLEXPEN U-100 INSULIN) 100 unit/mL (3 mL) InPn pen Inject insulin/carb ratio 1:12, target 150, correction factor 25. 150-200+2, 201-250+4, 251-300+6, 301-350+8, >350+10.Max daily 60 units. 60 mL 3    insulin syringe-needle U-100 0.5 mL 31 gauge x 5/16 Syrg use with admelog 100 each 0    lancets Misc To check BG 4 times daily, to use with insurance preferred meter, e10.65 400 each 3    norethindrone (ORTHO MICRONOR) 0.35 mg tablet Take 1 tablet (0.35 mg total) by mouth once daily. 28 tablet 11    ondansetron (ZOFRAN-ODT) 4 MG TbDL Take 1 tablet by mouth every 8 (eight) hours as needed for Nausea.      ONETOUCH DELICA PLUS LANCET 33 gauge Misc USE 1 UNIT TO CHECK GLUCOSE 4 TIMES DAILY      ONETOUCH VERIO FLEX METER Misc USE AS DIRECTED TO CHECK BLOOD GLUCOSE 4 TIMES DAILY      pantoprazole (PROTONIX) 40 MG tablet Take 1 tablet by mouth once daily.      pen needle, diabetic (BD EBEN 2ND GEN PEN NEEDLE) 32 gauge x 5/32" Ndle Use 5 times daily as directed (Patient taking differently: Use 5 times daily as directed) 400 each 3    polyethylene glycol (GLYCOLAX) 17 gram/dose powder Take 17 g by mouth once daily. 30 each 3     No current facility-administered medications on file prior to visit. " "      ALLERGIES:   Review of patient's allergies indicates:   Allergen Reactions    No known allergies         ROS:       Review of Systems   Constitutional:  Negative for fatigue, fever and unexpected weight change.   HENT:   Negative for lump/mass and mouth sores.    Eyes:  Negative for eye problems and icterus.   Respiratory:  Negative for cough and shortness of breath.    Cardiovascular:  Negative for chest pain and palpitations.   Gastrointestinal:  Negative for abdominal pain, blood in stool, nausea and vomiting.   Genitourinary:  Negative for dysuria and frequency.    Musculoskeletal:  Negative for back pain and neck pain.   Skin:  Negative for itching and rash.   Neurological:  Negative for headaches and light-headedness.   Hematological:  Negative for adenopathy. Does not bruise/bleed easily.   Psychiatric/Behavioral:  Negative for confusion and depression.      PHYSICAL EXAM:  Vitals:    09/22/22 1033   BP: 122/66   Pulse: 90   Resp: 16   Temp: 98.2 °F (36.8 °C)   SpO2: 99%   Weight: 82.5 kg (181 lb 14.1 oz)   Height: 5' 3" (1.6 m)   PainSc: 0-No pain       Physical Exam  Constitutional:       General: She is not in acute distress.     Appearance: She is well-developed. She is not diaphoretic.   HENT:      Head: Normocephalic and atraumatic.   Eyes:      General: No scleral icterus.     Conjunctiva/sclera: Conjunctivae normal.   Cardiovascular:      Rate and Rhythm: Normal rate and regular rhythm.      Heart sounds: Normal heart sounds. No murmur heard.    No friction rub. No gallop.   Pulmonary:      Effort: Pulmonary effort is normal. No respiratory distress.      Breath sounds: Normal breath sounds. No wheezing or rales.   Abdominal:      General: Bowel sounds are normal. There is no distension.      Palpations: Abdomen is soft.      Tenderness: There is no abdominal tenderness. There is no guarding.   Musculoskeletal:         General: No tenderness. Normal range of motion.      Cervical back: Normal " range of motion and neck supple.   Skin:     General: Skin is warm and dry.      Findings: No rash.   Neurological:      General: No focal deficit present.      Mental Status: She is alert and oriented to person, place, and time.   Psychiatric:         Behavior: Behavior normal.       LAB:   Results for orders placed or performed in visit on 09/22/22   CBC Auto Differential   Result Value Ref Range    WBC 6.52 3.90 - 12.70 K/uL    RBC 4.77 4.00 - 5.40 M/uL    Hemoglobin 12.9 12.0 - 16.0 g/dL    Hematocrit 42.0 37.0 - 48.5 %    MCV 88 82 - 98 fL    MCH 27.0 27.0 - 31.0 pg    MCHC 30.7 (L) 32.0 - 36.0 g/dL    RDW 14.1 11.5 - 14.5 %    Platelets 396 150 - 450 K/uL    MPV 9.9 9.2 - 12.9 fL    Immature Granulocytes 0.3 0.0 - 0.5 %    Gran # (ANC) 4.2 1.8 - 7.7 K/uL    Immature Grans (Abs) 0.02 0.00 - 0.04 K/uL    Lymph # 1.8 1.0 - 4.8 K/uL    Mono # 0.3 0.3 - 1.0 K/uL    Eos # 0.1 0.0 - 0.5 K/uL    Baso # 0.02 0.00 - 0.20 K/uL    nRBC 0 0 /100 WBC    Gran % 64.7 38.0 - 73.0 %    Lymph % 27.8 18.0 - 48.0 %    Mono % 5.1 4.0 - 15.0 %    Eosinophil % 1.8 0.0 - 8.0 %    Basophil % 0.3 0.0 - 1.9 %    Differential Method Automated    COMPREHENSIVE METABOLIC PANEL   Result Value Ref Range    Sodium 139 136 - 145 mmol/L    Potassium 4.0 3.5 - 5.1 mmol/L    Chloride 108 95 - 110 mmol/L    CO2 23 23 - 29 mmol/L    Glucose 211 (H) 70 - 110 mg/dL    BUN 12 6 - 20 mg/dL    Creatinine 0.8 0.5 - 1.4 mg/dL    Calcium 9.3 8.7 - 10.5 mg/dL    Total Protein 7.3 6.0 - 8.4 g/dL    Albumin 3.5 3.5 - 5.2 g/dL    Total Bilirubin 0.5 0.1 - 1.0 mg/dL    Alkaline Phosphatase 73 55 - 135 U/L    AST 30 10 - 40 U/L    ALT 42 10 - 44 U/L    Anion Gap 8 8 - 16 mmol/L    eGFR >60.0 >60 mL/min/1.73 m^2     *Note: Due to a large number of results and/or encounters for the requested time period, some results have not been displayed. A complete set of results can be found in Results Review.       PROBLEMS ASSESSED THIS VISIT:    1. Superior mesenteric vein  thrombosis    2. History of sleeve gastrectomy        ASSESSMENT AND PLAN    Ms. Andrew is a 27 year old female who presents for follow up of SMV thrombus which was provoked in the setting of bariatric surgery and OCPs. She has had negative hypercoagulable workup so far and is tolerating apixaban well.     - Stop apixaban after 3 months of anticoagulation, will be done 11/16/22  - Check Protein C, Protein S, and antithrombin III in 3 months, follow up one week later  - Patient has a trigger finger/carpal tunnel surgery scheduled for next month. Recommend delaying elective surgeries until she has completed 3 months of anticoagulation due to the location of her thrombus. Message sent to orthopedic surgeon.   - Patient is also planning on tubal ligation after apixaban is stopped, no contraindication to procedure from hematology perspective but will follow up after results of remainder of hypercoagulable workup    Discussed with Dr. Javed Rodarte, DO  PGY-IV  Hematology/Oncology Fellow    Route Chart for Scheduling    BMT Chart Routing      Follow up with physician 3 months. f/u with me one week after labs   Follow up with [facility]    Infusion scheduling note    Injection scheduling note    Labs   Lab interval:  CBC, CMP, antithrombin III, protein C, protein S, in 3 months   Imaging    Pharmacy appointment    Other referrals            able

## 2023-01-19 NOTE — ED PROVIDER NOTE - CROS ED ALLERGIC IMMUN ALL NEG
Follow up with cardiology  Follow up with pcp  Monitor weight  cardiac diet  Continue with isosorbide, lasix, asa, amiodarone, carvedilol, atorvastatin, spironolactone. sacubitril-valsartan  Monitor bp  unchanged   negative...

## 2023-04-10 PROBLEM — M48.061 SPINAL STENOSIS OF LUMBAR REGION: Status: ACTIVE | Noted: 2023-04-10

## 2023-04-10 PROBLEM — E89.0 POSTOPERATIVE HYPOTHYROIDISM: Status: ACTIVE | Noted: 2023-04-10

## 2023-05-02 ENCOUNTER — OFFICE VISIT (OUTPATIENT)
Dept: OBGYN CLINIC | Facility: CLINIC | Age: 53
End: 2023-05-02

## 2023-05-02 VITALS
SYSTOLIC BLOOD PRESSURE: 149 MMHG | HEART RATE: 73 BPM | HEIGHT: 63 IN | BODY MASS INDEX: 32.6 KG/M2 | WEIGHT: 184 LBS | DIASTOLIC BLOOD PRESSURE: 81 MMHG

## 2023-05-02 DIAGNOSIS — M54.50 CHRONIC MIDLINE LOW BACK PAIN WITHOUT SCIATICA: ICD-10-CM

## 2023-05-02 DIAGNOSIS — G89.29 CHRONIC MIDLINE LOW BACK PAIN WITHOUT SCIATICA: ICD-10-CM

## 2023-05-02 NOTE — PROGRESS NOTES
Assessment/Plan:  1  Chronic midline low back pain without sciatica  Ambulatory Referral to Orthopedic Surgery    Ambulatory referral to Physical Therapy          Sierra Vides has chronic lower back pain and has a history of sciatica and spinal stenosis  She has no imaging today and has really not tried any conservative measures thus far  I recommended beginning with formal physical therapy on her back  If the pain would persist or worsen despite PT we could consider x-ray and new MRI  She will work on trying to get the records of her prior imaging  She will follow-up after therapy if her pain persists or worsens we could consider continued work-up  Subjective:   John Lindsay is a 52 y o  female who presents to the office for evaluation for chronic low back pain  She has a history of lower back pain and was diagnosed with spinal stenosis about 4 years ago on MRI  She does not have the MRI films or report with her today  At the time she was diagnosed she was recommended to do physical therapy but never got around to it  She has not done any treatment thus far  She has pain that is intermittent and aching and throbbing in her lower back accompanied by occasional sciatica  Today she is not feeling any pain and she has no symptoms of radiculopathy or numbness today  She denies any new injury or weakness  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 [facility]  Review of Systems   Constitutional: Negative for chills, fever and unexpected weight change  HENT: Negative for hearing loss, nosebleeds and sore throat  Eyes: Negative for pain, redness and visual disturbance  Respiratory: Negative for cough, shortness of breath and wheezing  Cardiovascular: Negative for chest pain, palpitations and leg swelling  Gastrointestinal: Negative for abdominal pain, nausea and vomiting  Endocrine: Negative for polydipsia and polyuria  Genitourinary: Negative for dysuria and hematuria  Musculoskeletal:        See HPI   Skin: Negative for rash and wound  Neurological: Negative for dizziness, numbness and headaches  Psychiatric/Behavioral: Negative for decreased concentration and suicidal ideas  The patient is not nervous/anxious  History reviewed  No pertinent past medical history  Past Surgical History:   Procedure Laterality Date    BREAST CYST EXCISION Left 2002    BREAST SURGERY      THYROIDECTOMY         Family History   Problem Relation Age of Onset   Luis Fox Breast cancer Mother 61    Hypertension Mother     Diabetes Father     Hypertension Sister     Diabetes Brother     Breast cancer Other        Social History     Occupational History    Not on file   Tobacco Use    Smoking status: Never    Smokeless tobacco: Never   Vaping Use    Vaping Use: Never used   Substance and Sexual Activity    Alcohol use: Not Currently    Drug use: Never    Sexual activity: Not on file         Current Outpatient Medications:     calcium acetate (PHOSLO) capsule, Take 667 mg by mouth daily, Disp: , Rfl:     cholecalciferol (VITAMIN D3) 400 units tablet, Take 400 Units by mouth 2 (two) times a day, Disp: , Rfl:     ferrous gluconate (FERGON) 324 mg tablet, Take 324 mg by mouth daily with breakfast, Disp: , Rfl:     levothyroxine 100 mcg tablet, Take 1 tablet (100 mcg total) by mouth daily, Disp: 90 tablet, Rfl: 3    Multiple Vitamins-Minerals (multivitamin with minerals) tablet, Take 1 tablet by mouth daily, Disp: , Rfl:     Allergies   Allergen Reactions    Shrimp (Diagnostic) - Food Allergy Swelling    Erythromycin Rash       Objective:  Vitals:    05/02/23 1034   BP: 149/81   Pulse: 73       Back Exam     Tenderness   The patient is experiencing no tenderness       Range of Motion   Extension: normal   Flexion: normal     Muscle Strength   Right Quadriceps:  5/5   Left Quadriceps:  5/5   Right Hamstrings:  5/5   Left Hamstrings:  5/5     Tests   Straight leg raise right: negative  Straight leg raise left: negative    Other   Toe walk: normal  Heel walk: normal  Sensation: normal  Gait: normal   Erythema: no back redness            Physical Exam  Vitals and nursing note reviewed  Constitutional:       Appearance: Normal appearance  She is well-developed  HENT:      Head: Normocephalic and atraumatic  Right Ear: External ear normal       Left Ear: External ear normal    Eyes:      General: No scleral icterus  Extraocular Movements: Extraocular movements intact  Conjunctiva/sclera: Conjunctivae normal    Cardiovascular:      Rate and Rhythm: Normal rate  Pulmonary:      Effort: Pulmonary effort is normal  No respiratory distress  Musculoskeletal:      Cervical back: Normal range of motion and neck supple  Lumbar back: Negative right straight leg raise test and negative left straight leg raise test       Comments: See Ortho exam   Skin:     General: Skin is warm and dry  Neurological:      Mental Status: She is alert and oriented to person, place, and time  Psychiatric:         Behavior: Behavior normal          I have personally reviewed pertinent films in PACS and my interpretation is as follows: No available x-rays in the office today  This document was created using speech voice recognition software  Grammatical errors, random word insertions, pronoun errors, and incomplete sentences are an occasional consequence of this system due to software limitations, ambient noise, and hardware issues  Any formal questions or concerns about content, text, or information contained within the body of this dictation should be directly addressed to the provider for clarification

## 2023-05-19 ENCOUNTER — TELEPHONE (OUTPATIENT)
Dept: FAMILY MEDICINE CLINIC | Facility: CLINIC | Age: 53
End: 2023-05-19

## 2023-05-19 DIAGNOSIS — E89.0 POSTOPERATIVE HYPOTHYROIDISM: Primary | ICD-10-CM

## 2023-05-19 RX ORDER — LEVOTHYROXINE SODIUM 150 MCG
150 TABLET ORAL
Qty: 90 TABLET | Refills: 1 | Status: SHIPPED | OUTPATIENT
Start: 2023-05-19

## 2023-05-19 NOTE — TELEPHONE ENCOUNTER
Patient needs her levothyroxine changed to the Brand name synthroid  She also stated it is supposed to be 150 mcgs  Please advise  She needs by tomorrow  Can another Dr take care of this?

## 2023-06-05 ENCOUNTER — EVALUATION (OUTPATIENT)
Dept: PHYSICAL THERAPY | Facility: CLINIC | Age: 53
End: 2023-06-05
Payer: COMMERCIAL

## 2023-06-05 DIAGNOSIS — M54.16 LUMBAR RADICULOPATHY: Primary | ICD-10-CM

## 2023-06-05 DIAGNOSIS — G89.29 CHRONIC MIDLINE LOW BACK PAIN WITHOUT SCIATICA: ICD-10-CM

## 2023-06-05 DIAGNOSIS — M54.50 CHRONIC MIDLINE LOW BACK PAIN WITHOUT SCIATICA: ICD-10-CM

## 2023-06-05 PROCEDURE — 97162 PT EVAL MOD COMPLEX 30 MIN: CPT | Performed by: PHYSICAL THERAPIST

## 2023-06-05 NOTE — PROGRESS NOTES
PT Evaluation     Today's date: 2023  Patient name: Geovani Matos  : 1970  MRN: [id number]  Referring provider: Jamie Melton DO  Dx:   Encounter Diagnosis     ICD-10-CM    1  Lumbar radiculopathy  M54 16           Start Time: 1150  Stop Time: 6517  Total time in clinic (min): 45 minutes    Assessment  Assessment details: Geovani Matos is a 52 y o  female who presents with: Lumbar radiculopathy  (primary encounter diagnosis)  Pt presents with pain, decreased LE range of motion, decreased LE strength, decreased lumbar range of motion, impaired function, decreased activity tolerance, fair posture and poor body mechanics  Pt presents with these impairments and would benefit from skilled physical therapy to address these impairments in order to maximize their function  Pt appropriate for skilled PT to meet goals below  Advised pt to speak with MD re: her abdominal hernia as this may be contributing to her LBP     Impairments: abnormal muscle tone, abnormal or restricted ROM, abnormal movement, activity intolerance, impaired balance, impaired physical strength, lacks appropriate home exercise program, pain with function and poor body mechanics  Functional limitations: decreased walking tolerance, decreased standing toleranceUnderstanding of Dx/Px/POC: good   Prognosis: good    Goals        STG’s (t o be met in 4 weeks)  Patient will be independent in basic HEP    Patient will demonstrate proper posture with minimal cuing   patient will demonstrate good TA contraction with tranfers and gait   Patient will report pain less than 2/10 in LB with ADL's  Patient will report a 50% improvement in function      LTG’s (to be met in 8 weeks)  patient will be independent in comprehensive HEP    patient will increase foto score by 10 points   patient will report no functional limitations with walking   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  Patient will demonstrate proper lifting mechanics x 3 techniques "to avoid future injury independently      Plan  Patient would benefit from: skilled physical therapy  Planned modality interventions: cryotherapy and thermotherapy: hydrocollator packs  Planned therapy interventions: abdominal trunk stabilization, activity modification, ADL retraining, ADL training, balance/weight bearing training, breathing training, body mechanics training, flexibility, functional ROM exercises, gait training, graded activity, graded exercise, graded motor, home exercise program, transfer training, therapeutic training, therapeutic exercise, therapeutic activities, stretching, strengthening, postural training, patient education, neuromuscular re-education and manual therapy  Frequency: 2x week  Duration in weeks: 8  Treatment plan discussed with: patient        Subjective Evaluation    History of Present Illness  Mechanism of injury: Pt here for chronic c/o LBP  Pt reporting she had Rx for PT prior to covid and MRI of LB at that time  Pt reporting radicular symptoms with standing and walking, L LE worse than R LE  Pt reporting that she enjoys walking and is limited by pain in LB and B LES, L worse than R            Recurrent probem    Quality of life: good    Pain  Current pain rating: 3  At best pain rating: 3  At worst pain ratin  Location: across LB   Quality: radiating and tight  Relieving factors: heat, change in position and rest  Aggravating factors: standing, walking and stair climbing  Progression: no change    Social Support  Steps to enter house: yes  Stairs in house: yes   Lives in: multiple-level home    Employment status: working  Hand dominance: right  Exercise history: walking     Treatments  No previous or current treatments  Patient Goals  Patient goals for therapy: decreased pain, improved balance, increased motion, increased strength and independence with ADLs/IADLs  Patient goal: \"get a level of comfort that I can live with\"        Objective     Postural " Observations  Seated posture: fair  Standing posture: fair        Palpation   Left   Tenderness of the erector spinae and lumbar paraspinals  Right   Tenderness of the erector spinae and lumbar paraspinals  Additional Palpation Details  (+)     Neurological Testing     Sensation     Lumbar   Left   Intact: light touch    Right   Intact: light touch    Active Range of Motion   Cervical/Thoracic Spine       Thoracic    Flexion:  Restriction level: minimal  Extension:  Restriction level: minimal  Left lateral flexion:  Restriction level: minimal  Right lateral flexion:  Restriction level: minimal  Left rotation:  Restriction level: minimal  Right rotation:  Restriction level: minimal    Lumbar   Flexion:  Restriction level: minimal  Extension:  Restriction level: minimal  Left lateral flexion:  Restriction level: minimal  Right lateral flexion:  Restriction level: minimal  Left rotation:  Restriction level: moderate  Right rotation:  Restriction level: moderate    Strength/Myotome Testing     Left Hip   Planes of Motion   Flexion: 3+  Extension: 3+  Abduction: 3+  Adduction: 3+  External rotation: 3+  Internal rotation: 3+    Right Hip   Planes of Motion   Flexion: 3+  Extension: 3+  Abduction: 3+  Adduction: 3+  External rotation: 3+  Internal rotation: 3+    Left Knee   Flexion: 4-  Extension: 4-    Right Knee   Flexion: 4-  Extension: 4-    General Comments:      Lumbar Comments  (+) abdominal hernia noted during abdominal bracing assessment  Pt states she was aware of this and had planned on surgery pre covid for thtis issue  Poor core stabilization noted with LE movements  Insurance:  AMA/CMS Eval/ Re-eval POC expires Junaid Harper #/ Referral # Total units  Start date  Expiration date Extension  Visit limitation? PT only or  PT+OT?  Co-Insurance   [employer] 6/5        60 per clemente yr  $20                                                                  Date 6/5/23              Units:  Used 1              Authed:  Remaining                     Date               Units:  Used               Authed:  Remaining                      Date 6/5        Visit Number IE        Manual                                                      TherEx         Pelvic tilts x10 hold 5        abd bracing x10 hold 5        LAQ x10 hold 5 ankle pumps ea                                                     Neuro Re-Ed         SLR flexion         bridge                                                               TherAct         Patient education HEP issued and reviewed                                                                                Modalities                      Precautions: Chronic LBP  History reviewed  No pertinent past medical history

## 2023-09-30 ENCOUNTER — OFFICE VISIT (OUTPATIENT)
Dept: URGENT CARE | Facility: CLINIC | Age: 53
End: 2023-09-30
Payer: COMMERCIAL

## 2023-09-30 VITALS
BODY MASS INDEX: 31.54 KG/M2 | DIASTOLIC BLOOD PRESSURE: 94 MMHG | WEIGHT: 178 LBS | HEART RATE: 70 BPM | RESPIRATION RATE: 14 BRPM | HEIGHT: 63 IN | TEMPERATURE: 97 F | OXYGEN SATURATION: 99 % | SYSTOLIC BLOOD PRESSURE: 170 MMHG

## 2023-09-30 DIAGNOSIS — M79.89 INFLAMMATION OF SOFT TISSUE: Primary | ICD-10-CM

## 2023-09-30 PROCEDURE — 99213 OFFICE O/P EST LOW 20 MIN: CPT | Performed by: FAMILY MEDICINE

## 2023-09-30 RX ORDER — PREDNISONE 10 MG/1
10 TABLET ORAL DAILY
Qty: 7 TABLET | Refills: 0 | Status: SHIPPED | OUTPATIENT
Start: 2023-09-30 | End: 2023-10-07

## 2023-09-30 NOTE — PROGRESS NOTES
[facility]        NAME: Rao Jerez is a 52 y.o. female  : 1970    MRN: [id number]  DATE: 2023  TIME: 3:49 PM    Assessment and Plan   Inflammation of soft tissue [M79.89]  1. Inflammation of soft tissue  predniSONE 10 mg tablet        Is likely experiencing some inflammation on the left side of her face/skull possibly impinging a sensory nerve resulting in her current symptoms. Patient reassured that her exam appears completely unremarkable without any fascial distortions. Will treat with 5 to 7 days of low-dose steroid and patient advised on icing the left side of her face regularly. May also benefit from a 1 to 2-week course of an antihistamine such as Claritin, Allegra or Zyrtec. Patient Instructions     Follow up with PCP in 3-5 days. Proceed to  ER if symptoms worsen. Chief Complaint     Chief Complaint   Patient presents with   • Facial Pain     Pt reports of facial pain with concern for allergies. History of Present Illness       24-year-old female presents today due to concerns for swelling around the left eye and numbness over the left cheek. Denies any head trauma but does report seasonal allergies and is not currently on an antihistamine. This has persisted for the past few days. Review of Systems   Review of Systems   Constitutional: Negative for chills and fever. Respiratory: Negative for cough and shortness of breath. Cardiovascular: Negative for chest pain. Gastrointestinal: Negative for abdominal pain. Neurological: Positive for facial asymmetry and numbness. Negative for dizziness and headaches.      Current Medications       Current Outpatient Medications:   •  predniSONE 10 mg tablet, Take 1 tablet (10 mg total) by mouth daily for 7 days, Disp: 7 tablet, Rfl: 0  •  calcium acetate (PHOSLO) capsule, Take 667 mg by mouth daily, Disp: , Rfl:   •  cholecalciferol (VITAMIN D3) 400 units tablet, Take 400 Units by mouth 2 (two) times a day, Disp: , Rfl:   •  ferrous gluconate (FERGON) 324 mg tablet, Take 324 mg by mouth daily with breakfast, Disp: , Rfl:   •  Multiple Vitamins-Minerals (multivitamin with minerals) tablet, Take 1 tablet by mouth daily, Disp: , Rfl:   •  Synthroid 150 MCG tablet, Take 1 tablet (150 mcg total) by mouth daily in the early morning Name brand medically necessary, Disp: 90 tablet, Rfl: 1    Current Allergies     Allergies as of 09/30/2023 - Reviewed 09/30/2023   Allergen Reaction Noted   • Shrimp (diagnostic) - food allergy Swelling 04/10/2023   • Erythromycin Rash 04/10/2023            The following portions of the patient's history were reviewed and updated as appropriate: allergies, current medications, past family history, past medical history, past social history, past surgical history and problem list.     History reviewed. No pertinent past medical history. Past Surgical History:   Procedure Laterality Date   • BREAST CYST EXCISION Left 2002   • BREAST SURGERY     • THYROIDECTOMY         Family History   Problem Relation Age of Onset   • Breast cancer Mother 61   • Hypertension Mother    • Diabetes Father    • Hypertension Sister    • Diabetes Brother    • Breast cancer Other          Medications have been verified. Objective   /94   Pulse 70   Temp (!) 97 °F (36.1 °C)   Resp 14   Ht 5' 3" (1.6 m)   Wt 80.7 kg (178 lb)   SpO2 99%   BMI 31.53 kg/m²   No LMP recorded. Patient is perimenopausal.       Physical Exam     Physical Exam  Vitals and nursing note reviewed. Constitutional:       General: She is not in acute distress. Appearance: Normal appearance. She is not ill-appearing, toxic-appearing or diaphoretic. HENT:      Head: Normocephalic and atraumatic. Comments: Face is completely symmetric despite subjective puffiness around the left eye. Full movement of lips and eyes bilaterally. Nose: Nose normal.   Eyes:      General:         Right eye: No discharge.          Left eye: No discharge. Conjunctiva/sclera: Conjunctivae normal.   Pulmonary:      Effort: Pulmonary effort is normal.   Musculoskeletal:         General: No swelling or signs of injury. Skin:     General: Skin is warm. Findings: No erythema or lesion. Neurological:      General: No focal deficit present. Mental Status: She is alert and oriented to person, place, and time. Psychiatric:         Mood and Affect: Mood normal.         Behavior: Behavior normal.         Thought Content:  Thought content normal.         Judgment: Judgment normal.

## 2024-02-14 DIAGNOSIS — E89.0 POSTOPERATIVE HYPOTHYROIDISM: ICD-10-CM

## 2024-02-14 RX ORDER — LEVOTHYROXINE SODIUM 150 MCG
150 TABLET ORAL
Qty: 30 TABLET | Refills: 0 | Status: SHIPPED | OUTPATIENT
Start: 2024-02-14

## 2024-02-14 NOTE — TELEPHONE ENCOUNTER
Reason for call: Please note patient called says she only got 30D supply and she does not know why, chart shows dr oliveros would like labs completed. I advised patient and she stated no one contacted her. She will be going to the lab either this evening ot tomorrow to have labs done. She also state she only have 1 tab left.  She would like to have her medication refill.    [x] Refill   [] Prior Auth  [] Other:     Office:   [x] PCP/Provider - Dr Lawler  [] Specialty/Provider -     Medication: synthroid    Dose/Frequency: 150 mcg    Quantity: ?    Pharmacy: [facility] on file    Does the patient have enough for 3 days?   [] Yes   [x] No - Send as HP to POD

## 2024-02-16 ENCOUNTER — APPOINTMENT (OUTPATIENT)
Dept: OPHTHALMOLOGY | Facility: CLINIC | Age: 54
End: 2024-02-16
Payer: COMMERCIAL

## 2024-02-16 ENCOUNTER — NON-APPOINTMENT (OUTPATIENT)
Age: 54
End: 2024-02-16

## 2024-02-16 PROCEDURE — 92004 COMPRE OPH EXAM NEW PT 1/>: CPT

## 2024-02-16 PROCEDURE — 92133 CPTRZD OPH DX IMG PST SGM ON: CPT

## 2024-02-16 PROCEDURE — 92083 EXTENDED VISUAL FIELD XM: CPT

## 2024-03-20 ENCOUNTER — APPOINTMENT (OUTPATIENT)
Dept: LAB | Facility: HOSPITAL | Age: 54
End: 2024-03-20
Payer: COMMERCIAL

## 2024-03-20 ENCOUNTER — TELEPHONE (OUTPATIENT)
Dept: FAMILY MEDICINE CLINIC | Facility: CLINIC | Age: 54
End: 2024-03-20

## 2024-03-20 DIAGNOSIS — E89.0 POSTOPERATIVE HYPOTHYROIDISM: ICD-10-CM

## 2024-03-20 DIAGNOSIS — Z11.59 ENCOUNTER FOR HEPATITIS C SCREENING TEST FOR LOW RISK PATIENT: ICD-10-CM

## 2024-03-20 DIAGNOSIS — Z13.6 SCREENING FOR CARDIOVASCULAR CONDITION: ICD-10-CM

## 2024-03-20 DIAGNOSIS — R73.09 ELEVATED GLUCOSE: ICD-10-CM

## 2024-03-20 DIAGNOSIS — R73.09 ELEVATED GLUCOSE: Primary | ICD-10-CM

## 2024-03-20 LAB
ALBUMIN SERPL BCP-MCNC: 4.3 G/DL (ref 3.5–5)
ALP SERPL-CCNC: 146 U/L (ref 34–104)
ALT SERPL W P-5'-P-CCNC: 24 U/L (ref 7–52)
ANION GAP SERPL CALCULATED.3IONS-SCNC: 5 MMOL/L (ref 4–13)
AST SERPL W P-5'-P-CCNC: 16 U/L (ref 13–39)
BASOPHILS # BLD AUTO: 0.03 THOUSANDS/ÂΜL (ref 0–0.1)
BASOPHILS NFR BLD AUTO: 0 % (ref 0–1)
BILIRUB SERPL-MCNC: 0.36 MG/DL (ref 0.2–1)
BUN SERPL-MCNC: 13 MG/DL (ref 5–25)
CALCIUM SERPL-MCNC: 9.7 MG/DL (ref 8.4–10.2)
CHLORIDE SERPL-SCNC: 103 MMOL/L (ref 96–108)
CHOLEST SERPL-MCNC: 201 MG/DL
CO2 SERPL-SCNC: 31 MMOL/L (ref 21–32)
CREAT SERPL-MCNC: 0.81 MG/DL (ref 0.6–1.3)
EOSINOPHIL # BLD AUTO: 0.19 THOUSAND/ÂΜL (ref 0–0.61)
EOSINOPHIL NFR BLD AUTO: 2 % (ref 0–6)
ERYTHROCYTE [DISTWIDTH] IN BLOOD BY AUTOMATED COUNT: 11.9 % (ref 11.6–15.1)
EST. AVERAGE GLUCOSE BLD GHB EST-MCNC: 298 MG/DL
GFR SERPL CREATININE-BSD FRML MDRD: 81 ML/MIN/1.73SQ M
GLUCOSE P FAST SERPL-MCNC: 268 MG/DL (ref 65–99)
HBA1C MFR BLD: 12 %
HCT VFR BLD AUTO: 44.6 % (ref 34.8–46.1)
HCV AB SER QL: NORMAL
HDLC SERPL-MCNC: 56 MG/DL
HGB BLD-MCNC: 14.2 G/DL (ref 11.5–15.4)
IMM GRANULOCYTES # BLD AUTO: 0.03 THOUSAND/UL (ref 0–0.2)
IMM GRANULOCYTES NFR BLD AUTO: 0 % (ref 0–2)
LDLC SERPL CALC-MCNC: 121 MG/DL (ref 0–100)
LYMPHOCYTES # BLD AUTO: 1.98 THOUSANDS/ÂΜL (ref 0.6–4.47)
LYMPHOCYTES NFR BLD AUTO: 23 % (ref 14–44)
MCH RBC QN AUTO: 30.7 PG (ref 26.8–34.3)
MCHC RBC AUTO-ENTMCNC: 31.8 G/DL (ref 31.4–37.4)
MCV RBC AUTO: 96 FL (ref 82–98)
MONOCYTES # BLD AUTO: 0.57 THOUSAND/ÂΜL (ref 0.17–1.22)
MONOCYTES NFR BLD AUTO: 7 % (ref 4–12)
NEUTROPHILS # BLD AUTO: 5.67 THOUSANDS/ÂΜL (ref 1.85–7.62)
NEUTS SEG NFR BLD AUTO: 68 % (ref 43–75)
NONHDLC SERPL-MCNC: 145 MG/DL
NRBC BLD AUTO-RTO: 0 /100 WBCS
PLATELET # BLD AUTO: 390 THOUSANDS/UL (ref 149–390)
PMV BLD AUTO: 10 FL (ref 8.9–12.7)
POTASSIUM SERPL-SCNC: 4.7 MMOL/L (ref 3.5–5.3)
PROT SERPL-MCNC: 8.1 G/DL (ref 6.4–8.4)
RBC # BLD AUTO: 4.63 MILLION/UL (ref 3.81–5.12)
SODIUM SERPL-SCNC: 139 MMOL/L (ref 135–147)
TRIGL SERPL-MCNC: 120 MG/DL
TSH SERPL DL<=0.05 MIU/L-ACNC: 0.49 UIU/ML (ref 0.45–4.5)
WBC # BLD AUTO: 8.47 THOUSAND/UL (ref 4.31–10.16)

## 2024-03-20 PROCEDURE — 84443 ASSAY THYROID STIM HORMONE: CPT

## 2024-03-20 PROCEDURE — 80053 COMPREHEN METABOLIC PANEL: CPT

## 2024-03-20 PROCEDURE — 86803 HEPATITIS C AB TEST: CPT

## 2024-03-20 PROCEDURE — 83036 HEMOGLOBIN GLYCOSYLATED A1C: CPT

## 2024-03-20 PROCEDURE — 36415 COLL VENOUS BLD VENIPUNCTURE: CPT

## 2024-03-20 PROCEDURE — 85025 COMPLETE CBC W/AUTO DIFF WBC: CPT

## 2024-03-20 PROCEDURE — 80061 LIPID PANEL: CPT

## 2024-03-20 RX ORDER — LEVOTHYROXINE SODIUM 150 MCG
150 TABLET ORAL
Qty: 30 TABLET | Refills: 0 | Status: SHIPPED | OUTPATIENT
Start: 2024-03-20

## 2024-03-20 NOTE — TELEPHONE ENCOUNTER
Reason for call:   [x] Refill   [] Prior Auth  [] Other:     Office:   [x] PCP/Provider - Loni Lawler MD   [] Specialty/Provider -     Medication: Synthroid 150 MCG tablet     Dose/Frequency: Take 1 tablet (150 mcg total) by mouth daily in the early morning     Quantity: 30 tablet     Pharmacy: [facility] at [place], [place] - [facility] [address]     Does the patient have enough for 3 days?   [] Yes   [x] No - Send as HP to POD

## 2024-03-21 ENCOUNTER — TELEPHONE (OUTPATIENT)
Dept: FAMILY MEDICINE CLINIC | Facility: CLINIC | Age: 54
End: 2024-03-21

## 2024-03-21 NOTE — TELEPHONE ENCOUNTER
Attempted to call the patient to schedule an appointment. No answer and voicemail box is full. If patient returns call, please schedule.    Maryanne Washington LPN

## 2024-04-03 ENCOUNTER — OFFICE VISIT (OUTPATIENT)
Dept: FAMILY MEDICINE CLINIC | Facility: CLINIC | Age: 54
End: 2024-04-03
Payer: COMMERCIAL

## 2024-04-03 VITALS
RESPIRATION RATE: 18 BRPM | HEIGHT: 63 IN | WEIGHT: 177.4 LBS | DIASTOLIC BLOOD PRESSURE: 78 MMHG | SYSTOLIC BLOOD PRESSURE: 142 MMHG | BODY MASS INDEX: 31.43 KG/M2 | HEART RATE: 72 BPM | TEMPERATURE: 96.7 F

## 2024-04-03 DIAGNOSIS — E11.9 TYPE 2 DIABETES MELLITUS WITHOUT COMPLICATION, WITHOUT LONG-TERM CURRENT USE OF INSULIN (HCC): Primary | ICD-10-CM

## 2024-04-03 DIAGNOSIS — Z12.39 SCREENING BREAST EXAMINATION: ICD-10-CM

## 2024-04-03 PROCEDURE — 99213 OFFICE O/P EST LOW 20 MIN: CPT | Performed by: INTERNAL MEDICINE

## 2024-04-03 NOTE — PROGRESS NOTES
Name: Yary Walker      : 1970      MRN: [id number]  Encounter Provider: Loni Lawler MD  Encounter Date: 4/3/2024   Encounter department: [facility]    Assessment & Plan     1. Type 2 diabetes mellitus without complication, without long-term current use of insulin ([facility])  Assessment & Plan:    Lab Results   Component Value Date    HGBA1C 12.0 (H) 2024     This is new. We discussed medication such as metformin, which I firmly believe she needs.  However, at this time she would like to see how she can do on her own over the next 3 months and defers medication. Referred to diabetic education and discussed need for low carb, complex carb substitutions.  Encouraged exercise at least 150 minutes per week. Discussed other comorbidities associated with DM and recommended eye exam and foot maintenance.  F/u in 3 months after repeat labs.  Asked patient to call sooner than next scheduled appointment for any complaints or issues.      Orders:  -     Ambulatory referral to Diabetic Education - use to refer for diabetes group classes, individual diabetes education, medical nutrition therapy, device training; Future; Expected date: 2024  -     Hemoglobin A1C; Future; Expected date: 2024  -     Comprehensive metabolic panel; Future; Expected date: 2024  -     CBC and Platelet; Future; Expected date: 2024  -     Lipid Panel with Direct LDL reflex; Future; Expected date: 2024  -     Albumin / creatinine urine ratio; Future; Expected date: 2024  -     Hemoglobin A1C  -     Comprehensive metabolic panel  -     CBC and Platelet  -     Lipid Panel with Direct LDL reflex  -     Albumin / creatinine urine ratio    2. Screening breast examination  -     Mammo screening bilateral w 3d & cad; Future           Subjective     Here to discuss labwork. Her glucose has been mildly elevated in the past but never in diabetic range.  She has a family history of DM.  Denies polyuria,  pollydipsia, blurred vision.  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.  She has a lot of stress and has not been following a healthy diet, but since she saw her labs has cut back drastically on carbs and has started walking on the treadmill for an hour a day. She really does not want to be on more medication right now.        Review of Systems   Constitutional: Negative.    Respiratory: Negative.     Cardiovascular: Negative.    Endocrine: Negative.        History reviewed. No pertinent past medical history.  Past Surgical History:   Procedure Laterality Date   • BREAST CYST EXCISION Left 2002   • BREAST SURGERY     • THYROIDECTOMY       Family History   Problem Relation Age of Onset   • Breast cancer Mother 60   • Hypertension Mother    • Diabetes Father    • Hypertension Sister    • Diabetes Brother    • Breast cancer Other      Social History     Socioeconomic History   • Marital status: Single     Spouse name: None   • Number of children: None   • Years of education: None   • Highest education level: None   Occupational History   • None   Tobacco Use   • Smoking status: Never     Passive exposure: Past   • Smokeless tobacco: Never   Vaping Use   • Vaping status: Never Used   Substance and Sexual Activity   • Alcohol use: Not Currently   • Drug use: Never   • Sexual activity: None   Other Topics Concern   • None   Social History Narrative   • None     Social Determinants of Health     Financial Resource Strain: Not on file   Food Insecurity: Not on file   Transportation Needs: Not on file   Physical Activity: Not on file   Stress: Not on file   Social Connections: Not on file   Intimate Partner Violence: Not on file   Housing Stability: Not on file     Current Outpatient Medications on File Prior to Visit   Medication Sig   • calcium acetate (PHOSLO) capsule Take 667 mg by mouth daily   • cholecalciferol (VITAMIN D3) 400 units tablet Take 400 Units by mouth 2 (two) times a day   • ferrous  "gluconate (FERGON) 324 mg tablet Take 324 mg by mouth daily with breakfast   • Multiple Vitamins-Minerals (multivitamin with minerals) tablet Take 1 tablet by mouth daily   • Synthroid 150 MCG tablet Take 1 tablet (150 mcg total) by mouth daily in the early morning     Allergies   Allergen Reactions   • Shrimp (Diagnostic) - Food Allergy Swelling   • Erythromycin Rash     Immunization History   Administered Date(s) Administered   • Zoster Vaccine Recombinant 04/10/2023       Objective     /78   Pulse 72   Temp (!) 96.7 °F (35.9 °C)   Resp 18   Ht 5' 3\" (1.6 m)   Wt 80.5 kg (177 lb 6.4 oz)   BMI 31.42 kg/m²     Physical Exam  Constitutional:       Appearance: She is well-developed.   Eyes:      Conjunctiva/sclera: Conjunctivae normal.   Neck:      Thyroid: No thyromegaly.      Vascular: No JVD.   Cardiovascular:      Rate and Rhythm: Normal rate and regular rhythm.      Heart sounds: Normal heart sounds. No murmur heard.     No friction rub. No gallop.   Pulmonary:      Effort: Pulmonary effort is normal.      Breath sounds: Normal breath sounds. No wheezing or rales.   Abdominal:      General: Bowel sounds are normal. There is no distension.      Palpations: Abdomen is soft.      Tenderness: There is no abdominal tenderness.   Musculoskeletal:      Cervical back: Neck supple.       Loni Lawler MD    "

## 2024-04-03 NOTE — ASSESSMENT & PLAN NOTE
Lab Results   Component Value Date    HGBA1C 12.0 (H) 03/20/2024     This is new. We discussed medication such as metformin, which I firmly believe she needs.  However, at this time she would like to see how she can do on her own over the next 3 months and defers medication. Referred to diabetic education and discussed need for low carb, complex carb substitutions.  Encouraged exercise at least 150 minutes per week. Discussed other comorbidities associated with DM and recommended eye exam and foot maintenance.  F/u in 3 months after repeat labs.  Asked patient to call sooner than next scheduled appointment for any complaints or issues.

## 2024-04-19 DIAGNOSIS — E89.0 POSTOPERATIVE HYPOTHYROIDISM: ICD-10-CM

## 2024-04-19 RX ORDER — LEVOTHYROXINE SODIUM 150 MCG
150 TABLET ORAL
Qty: 90 TABLET | Refills: 1 | Status: SHIPPED | OUTPATIENT
Start: 2024-04-19

## 2024-04-19 NOTE — TELEPHONE ENCOUNTER
Reason for call:   [x] Refill   [] Prior Auth  [] Other:     Office:   [x] PCP/Provider - Merged with [facility]  [] Specialty/Provider -     Medication:       Does the patient have enough for 3 days?   [] Yes   [x] No - Send as HP to POD

## 2024-05-28 ENCOUNTER — OFFICE VISIT (OUTPATIENT)
Dept: PHYSICAL THERAPY | Facility: CLINIC | Age: 54
End: 2024-05-28
Payer: COMMERCIAL

## 2024-05-28 DIAGNOSIS — G89.29 CHRONIC MIDLINE LOW BACK PAIN WITHOUT SCIATICA: Primary | ICD-10-CM

## 2024-05-28 DIAGNOSIS — M54.50 CHRONIC MIDLINE LOW BACK PAIN WITHOUT SCIATICA: Primary | ICD-10-CM

## 2024-05-28 PROCEDURE — 97161 PT EVAL LOW COMPLEX 20 MIN: CPT | Performed by: PHYSICAL THERAPIST

## 2024-05-28 NOTE — PROGRESS NOTES
PT Evaluation   Today's date: 2024  Patient name: Yary Walker  : 1970  MRN: [id number]  Referring provider: Self, Referral  Dx:   Encounter Diagnosis     ICD-10-CM    1. Chronic midline low back pain without sciatica  M54.50     G89.29             CASE SUMMARY:   Yary Walker is a 54 y.o. year old female who presents to OPPT upon referral from self  secondary to c/o low back pain chronic in nature.  Yary reports onset of symptoms ~  5 years ago as a result of nothing specific.  Occurs in episodes which come and go.  Current symptom location includes central low back and left hip and patient describes  current symptoms as: dull ache.  Symptoms are : intermittent.  Pain level:  Current: 5/10 in left hip and 3/10 low back and with ADL's 2/10.  Symptoms improve with: change of activity, rest, and worsen with over the course of the day, no pain upon rising unless she lies on her back.  Overall symptom progression at this time is unchanging.   Previous injury to this area: chronic  Previous treatment includes: PT,   Diagnostic testing includes: MRI 3 years +.     PMHx includes: See chart for full details with medications, allergies, past family history, past medical history, social history, past surgical history and problem list. All topics were reviewed.      Functionally, at baseline, Yary is independent with all basic ADL's and  advanced ADL's.  Currently, Yary is limited in the following activities: static standing for long periods of time.      Yary is lives with family in a 3 story home.   Recreational activities include: walks for exercise .  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     Patient goal(s) for physical therapy is: to have less pain     Concurrent Complaints:  Red flags present: neg     Reflexes: NT    Posture   Sitting:=   Standing: increased lordosis    LLD:left iliac crest higher then right      Skin creases:equal    Shift:mild to  right     Scoliosis:  "neg    Knees: wnl    Feet: reduced arches    Gait:    Assistive device: none   Trunk movement: wnl   Stride length: wnl    Trendelenburg: neg   Foot drop: neg    Functional movements:   Squat: wnl + pain jaspreet knees, good form   SLS: left: 40 sec    Right : 40 sec    Bridging: increased soreness low back, improved iwht cuing for glute set and driving heels into table     Transfers sit/stand: independent   bed mobility independent    Lumbar AROM:    Flexion: wnl LOM (-) pain   Extension: wnl LOM + pain central low back    Side bend: Right: wnl LOM (+) pain     Left: wnl LOM (+)pain     Rotation: NT    Repeated motions: BL: pain central low back: 3/10  Standing flexion:    Effect: 1x : NE, 10 x: NE, 10 x: NE  Standing extension:   Effect: 1x: reduced pain , 10x: produced stiffness, pain improved, B,   Supine flexion:    Effect: 1x: NE, 10 x: reduced stiffness, B  Prone extension:    Effect: prone lying: produced stiffness central low back, 1x: \"it feels good\"     Myotomes: wnl throughout jaspreet LE       LE ROM: grossly wnl jaspreet hips      Dermatomes: wnl to light touch jaspreet LE     Flexibility:  Hamstring: Right: fair  Left: fair   Hip flexors:Right: fair  Left: fair           FOTO score is 56% with a 78% prediction in function.       Assessment  Assessment details: Patient is a 54 y.o. Female who presents to skilled outpatient PT for the diagnosis of   Encounter Diagnosis     ICD-10-CM    1. Chronic midline low back pain without sciatica  M54.50     G89.29       . Patient presents with reduced mobility lspine particularly extension, reduced flexibility and reduced glute strength and will benefit from skilled PT to address the objective findings. The aforementioned impairments have limited the patient's ability to stand for prolong periods of time.   Patient vocalized a good understanding of  PLOC and HEP issued. Yary would benefit from skilled physical therapy services to address the functional limitations and progress " towards prior level of function, to meet stated goals,  and independence with home exercise program.  Prognosis for successful rehab outcome is good with consistent participation in therapy and carryover of HEP and PLOC.No further referral is necessary at this time based upon examination results. Patient education performed during today's session included: Hep and PLOC.     Impairments: Abnormal or restricted ROM, Activity intolerance, Impaired physical strength, Lacks appropriate HEP, Poor posture, and Pain with function  Understanding of Dx/Px/POC: Excellent  Prognosis: Good    STG  + Patient will report a 35% improvement in symptoms (2-3 weeks)   + Patient will be independent in basic HEP (2-3 weeks)  + Patient will demonstrate good posture with verbal cuing   (2-3 weeks)  + Patient will demonstrate an increase in range of motion  jaspreet hip flexors to  minimally limited (2-3 weeks)  + Patient will report increased ease standing due to a reduction in pain (2-3 weeks)    LTG:  + Patient will report a 75% improvement in symptoms (4-6 weeks)   + Patient will increase FOTO score by 10 points (8 sessions)   + Patient will be independent in comprehensive HEP (4-6 weeks)  + Patient will demonstrate an increase in range of motion  lumbar extension   to  within normal limits  (4-6 weeks)  + Patient will report no functional limitations (4-6 weeks)    Plan  Plan details: HEP development, stretching as needed per objective findings, strengthening core/lower quadrant, A/AA/PROM lumbar/hip motions, joint mobilizations prn, posture education, STM/MI as needed to reduce muscle tension per objective findings, muscle reeducation per objective findings, dynamic stabilization, PLOC discussed and agreed upon with patient focus on pelvic muscle flexibility, lspine mobility and glute/LE strength    Patient would benefit from: Skilled PT  Planned therapy interventions: Abdominal trunk stabilization, HEP, Joint mobilization, Manual  therapy, Neuromuscular re-education, Postural training, Strengthening, Stretching, and Therapeutic exercises  Frequency: 2x/wk  Duration in weeks: 4-6 weeks  Plan of Care beginning date: 05/28/24  Plan of Care expiration date: 6 weeks - 7/9/2024  Treatment plan discussed with: Patient    Patient verbalized understanding of POC. Please contact me if you have any questions or recommendations. Thank you for the referral and the opportunity to share in Yary Walker's care.          Eval/ Re-eval Auth #/ Referral # Total visits Start date  Expiration date Total active units  Total manual units  PT only or  PT+OT?   5/28/2024 No auth 60                      History reviewed. No pertinent past medical history.                  Reeval:   Insurance :         Visits: 1/60 2 3 4 5   Manual: 5/28/2024               Joint mobs: lspine prone                                        Ther ex/NMR:        [id number]        Manual stretching: hamstring, hip flexor, piriformis/glute        Rec bike/  nustep                LTR        Rigoberto test stretch  ++      hamstring stretch  ++              EIS instruct       Prone lying instruct       Prone press up instruct       Prone press up with exhale -       [id number]        Sit/stand with hip hinge                TA activation (stab cuff)        + marching        +hip abd iso        +hip add iso        + alternating LE extension                Glut set prone        Glut set + hip extension        Glute set + bridge        Sidelying hip extension iso hold        Therapeutic Activity:  Sitting posture: instruct w/use of lumbar support       Reevaluation                Gait Training:                 HEP:                 Modalities        MH        ice        Total time:             Access Code: [email or web address]  URL: [email or web address]  Date: 05/28/2024  Prepared by: Carmen Jacome    Exercises  - Prone Press Up  - 2-3 x daily - 7 x weekly - 1-2 sets - 10 reps  - Standing Lumbar Extension with  Counter  - 3 x daily - 7 x weekly - 1-2 sets - 10 reps  - Seated Correct Posture  - 1 x daily - 7 x weekly - 3 sets - 10 reps

## 2024-06-04 ENCOUNTER — OFFICE VISIT (OUTPATIENT)
Dept: PHYSICAL THERAPY | Facility: CLINIC | Age: 54
End: 2024-06-04
Payer: COMMERCIAL

## 2024-06-04 DIAGNOSIS — M54.50 CHRONIC MIDLINE LOW BACK PAIN WITHOUT SCIATICA: Primary | ICD-10-CM

## 2024-06-04 DIAGNOSIS — G89.29 CHRONIC MIDLINE LOW BACK PAIN WITHOUT SCIATICA: Primary | ICD-10-CM

## 2024-06-04 DIAGNOSIS — E89.0 POSTOPERATIVE HYPOTHYROIDISM: ICD-10-CM

## 2024-06-04 PROCEDURE — 97112 NEUROMUSCULAR REEDUCATION: CPT | Performed by: PHYSICAL THERAPIST

## 2024-06-04 PROCEDURE — 97110 THERAPEUTIC EXERCISES: CPT | Performed by: PHYSICAL THERAPIST

## 2024-06-04 NOTE — PROGRESS NOTES
Daily Note         Today's date: 2024  Patient name: aYry Walker  : 1970  MRN: [id number]  Referring provider: Self, Referral  Dx:   Encounter Diagnosis     ICD-10-CM    1. Chronic midline low back pain without sciatica  M54.50     G89.29           Subjective: Yary Walker reports she did her standing lumbar extensions frequently throughout day which was helpful        Objective: See treatment diary below    Assessment:   patient needed manual and verbal cuing for proper form with TA and stabilization.  Only minimal cuing was required with good carryover for increased reps.  She was challenged with squats to chair, but was able to complete reps.  Required a pillow under hip due to soreness in low back with hip extensions, but soreness resolved with pillow. . The goal of the current treatment is to address patient's functional limitations as well as objective findings.       Plan:  progress stab and improving lumbar extension ROM.            Eval/ Re-eval Auth #/ Referral # Total visits Start date  Expiration date Total active units  Total manual units  PT only or  PT+OT?   2024 No auth 60                      History reviewed. No pertinent past medical history.                  Reeval:   Insurance :         Visits:  3 4 5   Manual: 2024              Joint mobs: lspine prone  Performed grade III/IV                                      Ther ex/NMR:        [id number]  TM: 3.0 mph, 10 min .3 miles       Manual stretching: hamstring, hip flexor, piriformis/glute        Rec bike/  nustep                LTR        Rigoberto test stretch  ++      hamstring stretch  ++              EIS instruct       Prone lying instruct       Prone press up instruct 10 x 2      Prone press up with exhale -       [id number]        Sit/stand with hip hinge  10 x2 tap to standard chair              TA activation (stab cuff)  5 sec x 10        + marching        +BKFO  Red 10 x each side      +hip add iso  5 sec x  15      + alternating LE extension                Glut set prone  5 sec x10       Glut set + hip extension  Glute set: 20 x pillow under hips      Glute set + bridge  5 sec x10       Sidelying hip extension iso hold   ++     Therapeutic Activity:  Sitting posture: instruct w/use of lumbar support       Reevaluation                Gait Training:                 HEP:                 Modalities        MH        ice        Total time:             Access Code: [email or web address]  URL: [email or web address]  Date: 05/28/2024  Prepared by: Carmen Jacome    Exercises  - Prone Press Up  - 2-3 x daily - 7 x weekly - 1-2 sets - 10 reps  - Standing Lumbar Extension with Counter  - 3 x daily - 7 x weekly - 1-2 sets - 10 reps  - Seated Correct Posture  - 1 x daily - 7 x weekly - 3 sets - 10 reps

## 2024-06-05 ENCOUNTER — OFFICE VISIT (OUTPATIENT)
Dept: PHYSICAL THERAPY | Facility: CLINIC | Age: 54
End: 2024-06-05
Payer: COMMERCIAL

## 2024-06-05 DIAGNOSIS — G89.29 CHRONIC MIDLINE LOW BACK PAIN WITHOUT SCIATICA: Primary | ICD-10-CM

## 2024-06-05 DIAGNOSIS — M54.50 CHRONIC MIDLINE LOW BACK PAIN WITHOUT SCIATICA: Primary | ICD-10-CM

## 2024-06-05 PROCEDURE — 97112 NEUROMUSCULAR REEDUCATION: CPT | Performed by: PHYSICAL THERAPIST

## 2024-06-05 PROCEDURE — 97110 THERAPEUTIC EXERCISES: CPT | Performed by: PHYSICAL THERAPIST

## 2024-06-05 RX ORDER — LEVOTHYROXINE SODIUM 150 MCG
150 TABLET ORAL
Qty: 90 TABLET | Refills: 1 | Status: SHIPPED | OUTPATIENT
Start: 2024-06-05

## 2024-06-05 NOTE — PROGRESS NOTES
Daily Note         Today's date: 2024  Patient name: Yary Walker  : 1970  MRN: [id number]  Referring provider: Self, Referral  Dx:   Encounter Diagnosis     ICD-10-CM    1. Chronic midline low back pain without sciatica  M54.50     G89.29           Subjective: Yary Walker reports only stiffness entering today, no pain        Objective: See treatment diary below    Assessment:   reports reduction in stiffness after TM and prone press ups.  .  Patient fatigued post session. Needs cuing verbal and manual for proper timing of glute with prone leg lift. Also noted minimal cuing needed for proper TA recruitment and to avoid over recruitment of posterior musculature.   Plan:  progress stab, thea extension program and flexibility        Eval/ Re-eval Auth #/ Referral # Total visits Start date  Expiration date Total active units  Total manual units  PT only or  PT+OT?   2024 No auth 60                      History reviewed. No pertinent past medical history.                  Reeval:   Insurance :         Visits: 1/60 2/60 3/60 4 5   Manual: 2024             Joint mobs: lspine prone  Performed grade III/IV Performed grade III/IV                                     Ther ex/NMR:        [id number]  TM: 3.0 mph, 10 min .3 miles  TM: 3.0 mph, 7 min .3 miles      Manual stretching: hamstring, hip flexor, piriformis/glute        Rec bike/  nustep        Walking lunges   5 x 2 rounds each side     LTR        Rigoberto test stretch  ++      hamstring stretch  ++              EIS instruct       Prone lying instruct --      Prone press up instruct 10 x 2 10 x 2     Prone press up with exhale - --      [id number]        Sit/stand with hip hinge  10 x2 tap to standard chair 10 x 2              TA activation (stab cuff)  5 sec x 10   5 sec x 10      + marching        +BKFO  Red 10 x each side Red: --     +hip add iso  5 sec x 15 D/c      + alternating LE extension        + clamshells   5 sec x 10     Glut  set prone  5 sec x10  5 sec x 10      Glut set + hip extension  Glute set: 20 x pillow under hips 5 sec x10 x each side     Glute set + bridge  5 sec x10  + hip add: 5 sec x 10    + hip abd: --             Therapeutic Activity:  Sitting posture: instruct w/use of lumbar support       Reevaluation                Gait Training:                 HEP:                 Modalities                ice        Total time:             Access Code: [email or web address]  URL: [email or web address]  Date: 05/28/2024  Prepared by: Carmen Jacome    Exercises  - Prone Press Up  - 2-3 x daily - 7 x weekly - 1-2 sets - 10 reps  - Standing Lumbar Extension with Counter  - 3 x daily - 7 x weekly - 1-2 sets - 10 reps  - Seated Correct Posture  - 1 x daily - 7 x weekly - 3 sets - 10 reps

## 2024-06-10 ENCOUNTER — TELEPHONE (OUTPATIENT)
Dept: PHYSICAL THERAPY | Facility: CLINIC | Age: 54
End: 2024-06-10

## 2024-06-10 NOTE — TELEPHONE ENCOUNTER
Phoned patient due to no show PT appointment.  Attempted to contact patient but unable to leave a message due to mailbox was full.

## 2024-06-17 ENCOUNTER — OFFICE VISIT (OUTPATIENT)
Dept: OBGYN CLINIC | Facility: CLINIC | Age: 54
End: 2024-06-17
Payer: COMMERCIAL

## 2024-06-17 VITALS
BODY MASS INDEX: 30.65 KG/M2 | SYSTOLIC BLOOD PRESSURE: 140 MMHG | WEIGHT: 173 LBS | HEIGHT: 63 IN | DIASTOLIC BLOOD PRESSURE: 94 MMHG

## 2024-06-17 DIAGNOSIS — Z01.419 ENCOUNTER FOR GYNECOLOGICAL EXAMINATION WITHOUT ABNORMAL FINDING: Primary | ICD-10-CM

## 2024-06-17 DIAGNOSIS — Z12.12 ENCOUNTER FOR COLORECTAL CANCER SCREENING: ICD-10-CM

## 2024-06-17 DIAGNOSIS — R92.30 DENSE BREAST: ICD-10-CM

## 2024-06-17 DIAGNOSIS — Z12.11 ENCOUNTER FOR COLORECTAL CANCER SCREENING: ICD-10-CM

## 2024-06-17 DIAGNOSIS — Z80.3 FAMILY HISTORY OF BREAST CANCER: ICD-10-CM

## 2024-06-17 PROCEDURE — S0610 ANNUAL GYNECOLOGICAL EXAMINA: HCPCS | Performed by: OBSTETRICS & GYNECOLOGY

## 2024-06-17 NOTE — PROGRESS NOTES
Subjective      Yary Walker is a 54 y.o.  female who presents for new patient annual well woman exam. Periods are rare possibly more than 6 months apart most recent menses was 2024 lighter than typical. No intermenstrual bleeding, spotting, or discharge.  Patient reports Yes  hot flashes/night sweats, No pain problems intercourse, No vaginal dryness, sleeping difficulties she is a single parent caring for her children and her mother her nephew and also working nights.   Patient has significant menopausal symptoms but is planning to increase exercise and continue to watch her diet as her hemoglobin A1c has increased slightly as well as her blood pressure.  Discussed that these will improve her menopausal symptoms we also briefly discussed [place] and to continue monitoring blood pressure.  Patient also has significant family history of breast cancer her mother had breast cancer twice and her great aunt on the maternal side also had breast cancer.  Reviewed the option for genetic testing and patient would like referral.   Patient reports periods were somewhat heavy and caused some anemia in the past, normal Pap smears last was about 4 to 6 years ago.    Current contraception: tubal ligation  History of abnormal Pap smear: no  Family history of uterine or ovarian cancer: no  Regular self breast exam: yes  History of abnormal mammogram: no  Family history of breast cancer: yes -mother diagnosed twice and maternal great aunt    Menstrual History:  OB History          4    Para   4    Term   4            AB        Living   4         SAB        IAB        Ectopic        Multiple        Live Births   4           Obstetric Comments   4 - uncomplicated pregnancies              Menarche age: 11  Patient's last menstrual period was 2024 (approximate).       Past Medical History:   Diagnosis Date    Cervical stenosis of spine     Fibrocystic disease of both breasts     More so the left breast     Hyperthyroidism     Sciatic nerve pain     Getting PT     Past Surgical History:   Procedure Laterality Date    BREAST CYST EXCISION Left 2002    BREAST SURGERY      THYROIDECTOMY       OB History          4    Para   4    Term   4            AB        Living   4         SAB        IAB        Ectopic        Multiple        Live Births   4           Obstetric Comments   4 - uncomplicated pregnancies             Current Outpatient Medications   Medication Instructions    calcium acetate (PHOSLO) 667 mg, Oral, Daily    cholecalciferol (VITAMIN D3) 400 Units, Oral, 2 times daily    ferrous gluconate (FERGON) 324 mg, Oral, Daily with breakfast    Multiple Vitamins-Minerals (multivitamin with minerals) tablet 1 tablet, Oral, Daily    Synthroid 150 mcg, Oral, Daily (early morning)     Allergies   Allergen Reactions    Shrimp (Diagnostic) - Food Allergy Swelling    Erythromycin Rash     Social History     Tobacco Use    Smoking status: Never     Passive exposure: Past    Smokeless tobacco: Never   Vaping Use    Vaping status: Never Used   Substance Use Topics    Alcohol use: Not Currently    Drug use: Never     Review of Systems  Review of Systems   Constitutional:  Negative for fatigue, fever and unexpected weight change.   HENT:  Negative for dental problem, sinus pressure and sinus pain.    Eyes:  Negative for visual disturbance.   Respiratory:  Negative for cough, shortness of breath and wheezing.    Cardiovascular:  Negative for chest pain and leg swelling.   Gastrointestinal:  Negative for blood in stool, constipation, diarrhea, nausea and vomiting.   Endocrine: Negative for cold intolerance, heat intolerance and polydipsia.   Genitourinary:  Positive for menstrual problem. Negative for dysuria, frequency, hematuria and pelvic pain.        Becoming irregular    Musculoskeletal:  Positive for back pain. Negative for arthralgias.   Neurological:  Negative for dizziness, seizures and headaches.  "  Psychiatric/Behavioral:  The patient is not nervous/anxious.       Objective      Ht 5' 3\" (1.6 m)   Wt 78.5 kg (173 lb)   LMP 04/25/2024 (Approximate)   BMI 30.65 kg/m²   Vitals:    06/17/24 1416   Weight: 78.5 kg (173 lb)   Height: 5' 3\" (1.6 m)     General:   alert and oriented, in no acute distress   Heart: regular rate and rhythm, S1, S2 normal, no murmur, click, rub or gallop   Lungs: clear to auscultation bilaterally   Abdomen: soft, non-tender, without masses or organomegaly   Vulva: normal, lighter skin and old scarring around perineum discussed and reviewed   Vagina: normal mucosa, normal discharge   Cervix: multiparous appearance, no bleeding following Pap, no cervical motion tenderness, and no lesions   Uterus: mobile, non-tender, top normal size   Adnexa: normal adnexa and no mass, fullness, tenderness   Breast inspection negative, no nipple discharge or bleeding, no masses or nodularity palpable  Rectal negative, stool guaiac negative  Thyroid normal no masses or nodules     Assessment   54-year-old G4, P4 here for annual exam.  History of normal Paps.  Last mammogram April 2023 normal dense counseled.  Reports due for next colonoscopy.     Plan   ThinPrep with cotesting performed, given slip for ABUS to add to ordered mammogram check with insurance first, referral for genetic oncology testing, referral for gastroenterology return in 1 year or sooner as needed  "

## 2024-06-21 LAB
CYTOLOGIST CVX/VAG CYTO: NORMAL
DX ICD CODE: NORMAL
HPV GENOTYPE REFLEX: NORMAL
HPV I/H RISK 4 DNA CVX QL PROBE+SIG AMP: NEGATIVE
OTHER STN SPEC: NORMAL
PATH REPORT.FINAL DX SPEC: NORMAL
SL AMB NOTE:: NORMAL
SL AMB SPECIMEN ADEQUACY: NORMAL
SL AMB TEST METHODOLOGY: NORMAL

## 2024-06-24 NOTE — PROGRESS NOTE ADULT - PROVIDER SPECIALTY LIST ADULT
I can order a Lyme titer and I agree that he should be on some doxycycline.  However, the question is what duration of treatment.  I would like him to set up a virtual visit with me today at 4:40 PM.  Ideally this should be a video visit so that I can see the rash that he is describing.   Dental

## 2024-06-25 ENCOUNTER — OFFICE VISIT (OUTPATIENT)
Dept: PHYSICAL THERAPY | Facility: CLINIC | Age: 54
End: 2024-06-25
Payer: COMMERCIAL

## 2024-06-25 DIAGNOSIS — G89.29 CHRONIC MIDLINE LOW BACK PAIN WITHOUT SCIATICA: Primary | ICD-10-CM

## 2024-06-25 DIAGNOSIS — M54.50 CHRONIC MIDLINE LOW BACK PAIN WITHOUT SCIATICA: Primary | ICD-10-CM

## 2024-06-25 PROCEDURE — 97110 THERAPEUTIC EXERCISES: CPT | Performed by: PHYSICAL THERAPIST

## 2024-06-25 PROCEDURE — 97112 NEUROMUSCULAR REEDUCATION: CPT | Performed by: PHYSICAL THERAPIST

## 2024-06-25 NOTE — PROGRESS NOTES
Daily Note         Today's date: 2024  Patient name: Yary Walker  : 1970  MRN: [id number]  Referring provider: Self, Referral  Dx:   Encounter Diagnosis     ICD-10-CM    1. Chronic midline low back pain without sciatica  M54.50     G89.29           Subjective: Yary Walker reports her sciatica was acting up last week in her left leg and her symptoms started left low back and radiated down to back of the knee.  States when that happened she rested.  No symptoms entering today.         Objective: See treatment diary below    Assessment:   patient only complaint of stiffness with standing extension stretches.     Focus of the session is to emphasize importance of self stretches to manage symptoms.   Reviewed stretches this session as well as continued stabilization exercises. Patient required less tension with TA and stab exercises then previous session.       Plan:  reeval next session  24        Eval/ Re-eval Auth #/ Referral # Total visits Start date  Expiration date Total active units  Total manual units  PT only or  PT+OT?   2024 No auth 60                      History reviewed. No pertinent past medical history.                  Reeval: 24  Insurance :         Visits: 1/60 2/60 3/60 4/60 5   Manual: 2024            Joint mobs: lspine prone  Performed grade III/IV Performed grade III/IV Performed grade III/IV                                    Ther ex/NMR:        23066  TM: 3.0 mph, 10 min .3 miles  TM: 3.0 mph, 7 min .3 miles  Not performed due to patient arrived late    Walking lunges   5 x 2 rounds each side ++    LTR        Rigoberto test stretch  ++      hamstring stretch  ++              EIS instruct   10 x     Prone press up instruct 10 x 2 10 x 2 10 x 2    Prone press up with exhale - --      10755        Sit/stand with hip hinge  10 x2 tap to standard chair 10 x 2  10 x 2             TA activation (stab cuff)  5 sec x 10   5 sec x 10  5 sec x 10       +BKFO  Red 10 x each side Red: -- Green: 10 x 5 sec each side     + clamshells   5 sec x 10 5 sec x 10      Glut set prone  5 sec x10  5 sec x 10  5 sec x 10      Glut set + hip extension  Glute set: 20 x pillow under hips 5 sec x10 x each side 5 sec x 10  each side    ADD UE    Glute set + bridge  5 sec x10  + hip add: 5 sec x 10    + hip abd: -- Standard: 10 x   + hip add iso: 5 sec x 10    + hip abd iso: 5 sec x 10  (green band)     TRX reverse lunges    10 x 2 rounds     Therapeutic Activity:  Sitting posture: instruct w/use of lumbar support       Reevaluation                Gait Training:                 HEP:                 Modalities                ice        Total time:             Access Code: [email or web address]  URL: [email or web address]  Date: 05/28/2024  Prepared by: Carmen Jacome    Exercises  - Prone Press Up  - 2-3 x daily - 7 x weekly - 1-2 sets - 10 reps  - Standing Lumbar Extension with Counter  - 3 x daily - 7 x weekly - 1-2 sets - 10 reps  - Seated Correct Posture  - 1 x daily - 7 x weekly - 3 sets - 10 reps

## 2024-07-03 ENCOUNTER — HOSPITAL ENCOUNTER (OUTPATIENT)
Dept: RADIOLOGY | Facility: HOSPITAL | Age: 54
Discharge: HOME | End: 2024-07-03
Payer: COMMERCIAL

## 2024-07-03 DIAGNOSIS — Z12.39 SCREENING BREAST EXAMINATION: ICD-10-CM

## 2024-07-03 PROCEDURE — 77063 BREAST TOMOSYNTHESIS BI: CPT

## 2024-07-03 PROCEDURE — 77067 SCR MAMMO BI INCL CAD: CPT

## 2024-07-29 ENCOUNTER — OFFICE VISIT (OUTPATIENT)
Dept: URGENT CARE | Facility: CLINIC | Age: 54
End: 2024-07-29
Payer: COMMERCIAL

## 2024-07-29 VITALS
BODY MASS INDEX: 31.11 KG/M2 | TEMPERATURE: 97 F | HEIGHT: 63 IN | RESPIRATION RATE: 20 BRPM | HEART RATE: 68 BPM | WEIGHT: 175.6 LBS | OXYGEN SATURATION: 99 % | DIASTOLIC BLOOD PRESSURE: 80 MMHG | SYSTOLIC BLOOD PRESSURE: 162 MMHG

## 2024-07-29 DIAGNOSIS — M75.41 IMPINGEMENT SYNDROME OF RIGHT SHOULDER: Primary | ICD-10-CM

## 2024-07-29 PROCEDURE — 3077F SYST BP >= 140 MM HG: CPT | Performed by: FAMILY MEDICINE

## 2024-07-29 PROCEDURE — 3079F DIAST BP 80-89 MM HG: CPT | Performed by: FAMILY MEDICINE

## 2024-07-29 PROCEDURE — 99213 OFFICE O/P EST LOW 20 MIN: CPT | Performed by: FAMILY MEDICINE

## 2024-07-29 NOTE — PROGRESS NOTES
[facility]        NAME: Yary Walker is a 54 y.o. female  : 1970    MRN: [id number]  DATE: 2024  TIME: 11:58 AM    Assessment and Plan   Impingement syndrome of right shoulder [M75.41]  1. Impingement syndrome of right shoulder              Patient Instructions     Pt declined prescription medications. Recommend OTC NSAIDs. Has appt with ortho scheduled in August. Follow up with PCP in 3-5 days if no better. Proceed to  ER if symptoms worsen.    If tests have been performed at [facility], our office will contact you with results if changes need to be made to the care plan discussed with you at the visit.  [facility]    Chief Complaint     Chief Complaint   Patient presents with   • Shoulder Pain     Started about 3 weeks ago. No known injury. Pain worsening. Starting to affected day to day activities. Pain now radiating from shoulder down to hand         History of Present Illness       Shoulder Pain   The pain is present in the right shoulder. This is a new problem. The current episode started 1 to 4 weeks ago. There has been no history of extremity trauma. The problem occurs constantly. The problem has been gradually worsening. The quality of the pain is described as aching and sharp. The pain is moderate. Associated symptoms include stiffness. Pertinent negatives include no fever or numbness. The symptoms are aggravated by activity. She has tried nothing for the symptoms.       Review of Systems   Review of Systems   Constitutional:  Negative for chills, fatigue and fever.   HENT:  Negative for postnasal drip and sore throat.    Respiratory:  Negative for cough and shortness of breath.    Cardiovascular:  Negative for chest pain and palpitations.   Gastrointestinal:  Negative for abdominal pain, nausea and vomiting.   Genitourinary:  Negative for dysuria.   Musculoskeletal:  Positive for arthralgias and stiffness. Negative for gait problem and  "joint swelling.   Skin:  Negative for rash.   Neurological:  Negative for dizziness, syncope, light-headedness, numbness and headaches.   Psychiatric/Behavioral:  Negative for agitation and confusion.    All other systems reviewed and are negative.        Current Medications       Current Outpatient Medications:   •  calcium acetate (PHOSLO) capsule, Take 667 mg by mouth daily, Disp: , Rfl:   •  cholecalciferol (VITAMIN D3) 400 units tablet, Take 400 Units by mouth 2 (two) times a day, Disp: , Rfl:   •  ferrous gluconate (FERGON) 324 mg tablet, Take 324 mg by mouth daily with breakfast, Disp: , Rfl:   •  Multiple Vitamins-Minerals (multivitamin with minerals) tablet, Take 1 tablet by mouth daily, Disp: , Rfl:   •  Synthroid 150 MCG tablet, Take 1 tablet (150 mcg total) by mouth daily in the early morning, Disp: 90 tablet, Rfl: 1    Current Allergies     Allergies as of 07/29/2024 - Reviewed 07/29/2024   Allergen Reaction Noted   • Shrimp (diagnostic) - food allergy Swelling 04/10/2023   • Erythromycin Rash 04/10/2023            The following portions of the patient's history were reviewed and updated as appropriate: allergies, current medications, past family history, past medical history, past social history, past surgical history and problem list.     Past Medical History:   Diagnosis Date   • Cervical stenosis of spine    • Fibrocystic disease of both breasts     More so the left breast   • Hyperthyroidism    • Sciatic nerve pain     Getting PT       Past Surgical History:   Procedure Laterality Date   • BREAST CYST EXCISION Left 2002   • BREAST SURGERY     • THYROIDECTOMY         Family History   Problem Relation Age of Onset   • Breast cancer Mother 60   • Hypertension Mother    • Diabetes Father    • Hypertension Sister    • Diabetes Brother    • Breast cancer Other          Medications have been verified.        Objective   /80   Pulse 68   Temp (!) 97 °F (36.1 °C)   Resp 20   Ht 5' 3\" (1.6 m)   Wt " 79.7 kg (175 lb 9.6 oz)   SpO2 99%   BMI 31.11 kg/m²   No LMP recorded. Patient is perimenopausal.       Physical Exam     Physical Exam  Vitals reviewed.   Constitutional:       General: She is not in acute distress.     Appearance: Normal appearance. She is not ill-appearing.   HENT:      Head: Normocephalic and atraumatic.   Eyes:      Extraocular Movements: Extraocular movements intact.      Conjunctiva/sclera: Conjunctivae normal.   Musculoskeletal:      Right shoulder: Tenderness present. No swelling or deformity. Normal range of motion.        Arms:       Cervical back: Normal range of motion.      Comments: + Breaux, Obriens and Neers. Negative Empty can and resisted IR and ER testing.   Skin:     General: Skin is warm.   Neurological:      General: No focal deficit present.      Mental Status: She is alert.   Psychiatric:         Mood and Affect: Mood normal.         Behavior: Behavior normal.         Judgment: Judgment normal.

## 2024-08-06 ENCOUNTER — APPOINTMENT (OUTPATIENT)
Dept: RADIOLOGY | Facility: CLINIC | Age: 54
End: 2024-08-06
Payer: COMMERCIAL

## 2024-08-06 ENCOUNTER — OFFICE VISIT (OUTPATIENT)
Dept: OBGYN CLINIC | Facility: CLINIC | Age: 54
End: 2024-08-06
Payer: COMMERCIAL

## 2024-08-06 VITALS
HEART RATE: 76 BPM | WEIGHT: 175 LBS | BODY MASS INDEX: 31.01 KG/M2 | HEIGHT: 63 IN | SYSTOLIC BLOOD PRESSURE: 132 MMHG | DIASTOLIC BLOOD PRESSURE: 79 MMHG

## 2024-08-06 DIAGNOSIS — M25.511 RIGHT SHOULDER PAIN, UNSPECIFIED CHRONICITY: ICD-10-CM

## 2024-08-06 DIAGNOSIS — M75.41 IMPINGEMENT SYNDROME OF RIGHT SHOULDER: Primary | ICD-10-CM

## 2024-08-06 PROCEDURE — 73030 X-RAY EXAM OF SHOULDER: CPT

## 2024-08-06 PROCEDURE — 99204 OFFICE O/P NEW MOD 45 MIN: CPT | Performed by: ORTHOPAEDIC SURGERY

## 2024-08-06 RX ORDER — NAPROXEN 500 MG/1
500 TABLET ORAL 2 TIMES DAILY WITH MEALS
Qty: 60 TABLET | Refills: 0 | Status: SHIPPED | OUTPATIENT
Start: 2024-08-06

## 2024-08-06 NOTE — PROGRESS NOTES
Assessment/Plan:  1. Impingement syndrome of right shoulder  XR shoulder 2+ vw right    Ambulatory referral to Physical Therapy    naproxen (NAPROSYN) 500 mg tablet          Yary has right-sided shoulder pain consistent with subacromial impingement.  She has no abnormal findings on her x-ray and no evidence of arthritis.  She does have appropriate range of motion and strength on exam in the office today.  I recommended formal physical therapy as initial treatment and follow-up in the office in 6 weeks for repeat evaluation.    Subjective:   Yary Walker is a 54 y.o. female who presents to the office for evaluation for right-sided shoulder pain.  She denies any injury or trauma.  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  She states she has increased pain sometimes when she is pushing and pulling at work but denies any specific trauma.  She denies any history of shoulder issues in the past.  She has pain with lifting overhead or reaching out in front.  She denies any numbness or tingling down her arm.  She feels an intermittent aching throbbing pain in the shoulder that can become sharp at times.      Review of Systems   Constitutional:  Negative for chills, fever and unexpected weight change.   HENT:  Negative for hearing loss, nosebleeds and sore throat.    Eyes:  Negative for pain, redness and visual disturbance.   Respiratory:  Negative for cough, shortness of breath and wheezing.    Cardiovascular:  Negative for chest pain, palpitations and leg swelling.   Gastrointestinal:  Negative for abdominal pain, nausea and vomiting.   Endocrine: Negative for polydipsia and polyuria.   Genitourinary:  Negative for dysuria and hematuria.   Musculoskeletal:         See HPI   Skin:  Negative for rash and wound.   Neurological:  Negative for dizziness, numbness and headaches.   Psychiatric/Behavioral:  Negative for decreased concentration and suicidal ideas. The patient is not nervous/anxious.          Past Medical History:    Diagnosis Date    Cervical stenosis of spine     Diabetes mellitus (HCC) 05/2024    Fibrocystic disease of both breasts     More so the left breast    Hyperthyroidism     Sciatic nerve pain     Getting PT       Past Surgical History:   Procedure Laterality Date    BREAST CYST EXCISION Left 2002    BREAST SURGERY      THYROIDECTOMY         Family History   Problem Relation Age of Onset    Breast cancer Mother 60    Hypertension Mother     Osteoporosis Mother     Diabetes Father     Hypertension Sister     Diabetes Brother     Breast cancer Other        Social History     Occupational History    Not on file   Tobacco Use    Smoking status: Never     Passive exposure: Past    Smokeless tobacco: Never   Vaping Use    Vaping status: Never Used   Substance and Sexual Activity    Alcohol use: Not Currently    Drug use: Never    Sexual activity: Yes     Partners: Male         Current Outpatient Medications:     calcium acetate (PHOSLO) capsule, Take 667 mg by mouth daily, Disp: , Rfl:     cholecalciferol (VITAMIN D3) 400 units tablet, Take 400 Units by mouth 2 (two) times a day, Disp: , Rfl:     ferrous gluconate (FERGON) 324 mg tablet, Take 324 mg by mouth daily with breakfast, Disp: , Rfl:     Multiple Vitamins-Minerals (multivitamin with minerals) tablet, Take 1 tablet by mouth daily, Disp: , Rfl:     Synthroid 150 MCG tablet, Take 1 tablet (150 mcg total) by mouth daily in the early morning, Disp: 90 tablet, Rfl: 1    Allergies   Allergen Reactions    Shrimp (Diagnostic) - Food Allergy Swelling    Erythromycin Rash       Objective:  Vitals:    08/06/24 1626   BP: 132/79   Pulse: 76     Pain Score:   5      Right Shoulder Exam     Tenderness   Right shoulder tenderness location: Subacromial space.    Range of Motion   Active abduction:  normal   Passive abduction:  normal   Extension:  normal   External rotation:  normal   Forward flexion:  normal   Internal rotation 0 degrees:  normal     Muscle Strength   Abduction:  5/5   Internal rotation: 5/5   External rotation: 5/5   Supraspinatus: 5/5   Subscapularis: 5/5   Biceps: 5/5     Tests   Breaux test: positive  Impingement: positive    Other   Erythema: absent  Sensation: normal  Pulse: present            Physical Exam  Vitals and nursing note reviewed.   Constitutional:       Appearance: Normal appearance. She is well-developed.   HENT:      Head: Normocephalic and atraumatic.      Right Ear: External ear normal.      Left Ear: External ear normal.   Eyes:      General: No scleral icterus.     Extraocular Movements: Extraocular movements intact.      Conjunctiva/sclera: Conjunctivae normal.   Cardiovascular:      Rate and Rhythm: Normal rate.   Pulmonary:      Effort: Pulmonary effort is normal. No respiratory distress.   Musculoskeletal:      Cervical back: Normal range of motion and neck supple.      Comments: See Ortho exam   Skin:     General: Skin is warm and dry.   Neurological:      General: No focal deficit present.      Mental Status: She is alert and oriented to person, place, and time.   Psychiatric:         Behavior: Behavior normal.         I have personally reviewed pertinent films in PACS and my interpretation is as follows:  X-rays of the right shoulder demonstrate no evidence of acute abnormality.      This document was created using speech voice recognition software.   Grammatical errors, random word insertions, pronoun errors, and incomplete sentences are an occasional consequence of this system due to software limitations, ambient noise, and hardware issues.   Any formal questions or concerns about content, text, or information contained within the body of this dictation should be directly addressed to the provider for clarification.

## 2024-11-12 ENCOUNTER — HOSPITAL ENCOUNTER (EMERGENCY)
Facility: HOSPITAL | Age: 54
Discharge: HOME | End: 2024-11-12
Attending: EMERGENCY MEDICINE
Payer: COMMERCIAL

## 2024-11-12 ENCOUNTER — APPOINTMENT (EMERGENCY)
Dept: RADIOLOGY | Facility: HOSPITAL | Age: 54
End: 2024-11-12
Payer: COMMERCIAL

## 2024-11-12 VITALS
HEART RATE: 78 BPM | RESPIRATION RATE: 18 BRPM | SYSTOLIC BLOOD PRESSURE: 157 MMHG | OXYGEN SATURATION: 100 % | TEMPERATURE: 97.3 F | WEIGHT: 176 LBS | DIASTOLIC BLOOD PRESSURE: 76 MMHG | BODY MASS INDEX: 31.18 KG/M2

## 2024-11-12 DIAGNOSIS — M54.9 BACK PAIN: ICD-10-CM
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
## 2024-11-12 DIAGNOSIS — S16.1XXA STRAIN OF NECK MUSCLE, INITIAL ENCOUNTER: ICD-10-CM

## 2024-11-12 PROCEDURE — 70450 CT HEAD/BRAIN W/O DYE: CPT

## 2024-11-12 PROCEDURE — 72100 X-RAY EXAM L-S SPINE 2/3 VWS: CPT

## 2024-11-12 PROCEDURE — 72125 CT NECK SPINE W/O DYE: CPT

## 2024-11-12 PROCEDURE — 72072 X-RAY EXAM THORAC SPINE 3VWS: CPT

## 2024-11-12 PROCEDURE — 99284 EMERGENCY DEPT VISIT MOD MDM: CPT

## 2024-11-12 PROCEDURE — 99284 EMERGENCY DEPT VISIT MOD MDM: CPT | Performed by: EMERGENCY MEDICINE

## 2024-11-12 RX ORDER — ACETAMINOPHEN 325 MG/1
975 TABLET ORAL ONCE
Status: COMPLETED | OUTPATIENT
Start: 2024-11-12 | End: 2024-11-12

## 2024-11-12 RX ORDER — NAPROXEN 500 MG/1
500 TABLET ORAL 2 TIMES DAILY WITH MEALS
Qty: 10 TABLET | Refills: 0 | Status: SHIPPED | OUTPATIENT
Start: 2024-11-12 | End: 2024-11-17

## 2024-11-12 RX ORDER — IBUPROFEN 600 MG/1
600 TABLET, FILM COATED ORAL ONCE
Status: COMPLETED | OUTPATIENT
Start: 2024-11-12 | End: 2024-11-12

## 2024-11-12 RX ORDER — CYCLOBENZAPRINE HCL 10 MG
10 TABLET ORAL 2 TIMES DAILY PRN
Qty: 10 TABLET | Refills: 0 | Status: SHIPPED | OUTPATIENT
Start: 2024-11-12 | End: 2024-11-17

## 2024-11-12 RX ORDER — CYCLOBENZAPRINE HCL 10 MG
10 TABLET ORAL ONCE
Status: COMPLETED | OUTPATIENT
Start: 2024-11-12 | End: 2024-11-12

## 2024-11-12 RX ADMIN — CYCLOBENZAPRINE 10 MG: 10 TABLET, FILM COATED ORAL at 12:05

## 2024-11-12 RX ADMIN — IBUPROFEN 600 MG: 600 TABLET, FILM COATED ORAL at 12:05

## 2024-11-12 RX ADMIN — ACETAMINOPHEN 975 MG: 325 TABLET ORAL at 12:05
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
## 2024-11-12 NOTE — DISCHARGE INSTRUCTIONS
Please take tylenol and naprosyn for pain do not take additional motrin aleeve and ibuprofen  Flexeril will make you drowsy so be mindful   Your ct of the cervical spine shows Status post thyroidectomy with possible residual right-sided thyroid tissue as above. Recommend correlation with operative history, comparison with any relevant priors when available. Please follow up with your doctor

## 2024-11-15 ENCOUNTER — TELEPHONE (OUTPATIENT)
Age: 54
End: 2024-11-15

## 2024-11-15 NOTE — LETTER
November 15, 2024    Yary Walker  [address] [facility] [address]      Dear Ms. Walker:        If you have any questions or concerns, please don't hesitate to call.    Sincerely,             Laurie Griggs        CC: No Recipients        COLONOSCOPY  MIRALAX/Dulcolax Bowel Preparation Instructions    The OR/GI Lab will contact you the evening prior to your procedure with your exact arrival time.    Our practice requires a 1 week notice for any cancellations or rescheduling. We kindly ask that you immediately notify us of any changes including any new medications that are prescribed. Thank you for your cooperation.     WEEK BEFORE YOUR PROCEDURE:  Stop taking Iron tablets.  5 days prior, AVOID vegetables and fruits with skins or seeds, nuts, corn, popcorn and whole grain breads.   Purchase: One (1) 238-gram container of Miralax (polyethylene glycol 3350), four (4) 5 mg Dulcolax (bisacodyl) tablets, and one (1) 64-ounce bottle of Gatorade (sports drink) - no red, orange, or purple. These may be purchased at any pharmacy without a prescription. Generic products are permissible.   Arrange responsible transportation for day of the procedure.     DAY BEFORE THE PROCEDURE:   CLEAR liquids only for entire day prior. Nothing red, orange or purple.    You MAY have:                                                               Soda  Water  Broth Gatorade  Jello  Popsicles Coffee/tea without milk/creamer     YOU MAY NOT HAVE:  Solid foods   Milk and milk products    Juice with pulp    BOWEL PREPARATION:  Includes: One (1) 238-gram container of Miralax (polyethylene glycol 3350), four (4) 5 mg Dulcolax (bisacodyl) tablets, and one (1) 64-ounce bottle of Gatorade (sports drink).  Preparation may be refrigerated.  Entire bowel prep should be completed.     Afternoon before the procedure (2:00 pm - 5:00 pm):    Take two (2) 5 mg Dulcolax laxative tablets.     Evening before the procedure (6:00 pm):  Mix entire container of  Miralax with one (1) 64-ounce bottle of Gatorade and shake until all medication is dissolved.   Begin drinking solution. Drink an eight (8) ounce cup every 10-15 minutes until you have consumed half (32 ounces) of the solution.  Refrigerate remaining solution.    Night before the procedure (8:00 pm):  Take two (2) 5 mg Dulcolax laxative tablets.     Beginning 5 hours before your procedure:  Drink the remaining amount of prepared solution (32 ounces).  Drink an eight (8) ounce cup every 10-15 minutes until you have consumed the remaining solution.     Bowel prep should be completed 4 hours prior to procedure time.    NOTHING TO EAT OR DRINK AFTER MIDNIGHT- EXCEPT FOR YOUR PREP    DAY OF THE PROCEDURE:  You may brush your teeth.  Leave all jewelry at home.  Please arrive for your procedure as indicated by the OR / GI Lab / Endoscopy Unit. The hospital will contact you the day before with your exact arrival time.   Make sure you have arranged ahead of time for a responsible adult (18 or older) to accompany and drive you home after the procedure.  Please discuss any transportation concerns with our staff prior to your procedure.    The effects of the anesthesia can persist for 24 hours.  After receiving the sedation, you must exercise caution before engaging in any activity that could harm yourself and others (such as driving a car).  Do not make any important decisions or do not drink any alcoholic beverages during this time period.  After your procedure, you may have anything you'd like to eat or drink.  You will probably want to start with something light.  Please include plenty of fluids.  Avoid items that cause gas such as sodas and salads.    SPECIAL INSTRUCTIONS:    For patients currently taking blood thinners and/or antiplatelet therapy our office will contact the prescribing provider.  Our office will contact you with any required changes to your medication regimen.     Blood thinner (i.e. - Coumadin, Pradaxa,  Lovenox, Xarelto, Eliquis)  ?  Continue (Do Not Stop)  ? Stop______________for_____________days prior to the procedure.    Antiplatelet (i.e. - Plavix, Aggrenox, Effient, Brilinta)  ?  Continue (Do Not Stop)  ? Stop______________for_____________days prior to the procedure.       Diabetes:   If you are Diabetic, please see separate Diabetic Instruction Sheet.          Prescribed medications:  Do not stop your aspirin, or any of your other medications (unless instructed otherwise).    Take the rest of your prescribed medications with small sips of water at least 2 hours prior to your procedure.      For any questions or concerns related to your bowel preparation or pre-procedure instructions, please contact our office at [phone].  Thank you for choosing [facility]!     Medicine Instructions for Adults with Diabetes who Need a Bowel Prep       Follow these instructions when a BOWEL PREP is required for your procedure or surgery!    NOTE:   GLP-1 Agonists taken weekly: do not take in the 7 days before your procedure   SGLT-2 Inhibitors: do not take in the 4 days before your procedure     On the Day Before Surgery/Procedure  If you are having a procedure (e.g. Colonoscopy) or surgery that requires a bowel prep and you may have at least a clear liquid diet, follow the directions below based on the type of medicine you take for your diabetes.     Type of Medicine You Take Examples What to do   Pre-Mixed Insulin - Intermediate Acting Humalog® 75/25, Humulin® 70/30, Novolog® 70/30, Regular Insulin Take ½ your regular dose the evening before your procedure   Rapid/Fast Acting Insulin Humalog® U200, NovoLog®, Apidra®, Fiasp® Take ½ your regular dose the evening before your procedure.   Long-Acting Insulin Lantus®, Levemir®, Tresiba®, Toujeo®, Basaglar® Take your FULL regular dose the day before procedure   Oral Sulfonylurea Glipizide/Glimepiride/Glucotrol® Take ½ your regular dose the evening before your  procedure   Other Oral Diabetes Medicines Metformin®, Glucophage®, Glucophage XR®, Riomet®, Glumetza®), Actose®, Avandia®, Glyset®, Prandin® Take your regular dose with dinner in the evening before your procedure   GLP-1 Agonists AdlyxinÒ, ByettaÒ, BydureonÒ, OzempicÒ, SoliquaÒ, TanzeumÒ, TrulicityÒ, VictozaÒ, Saxenda®, Rybelsus® If taken daily, take as normal    If taken weekly, do not take this medicine for 7 days before your procedure including the day of the procedure (resume taking after the procedure)   SGLT-2 Inhibitors Jardiance®, Invokana®, Farxiga®,   Steglatro®, Brenzavvy®, Qtern®, Segluromet®, Glyxambi®, Synjardy®, Synjardy XR®, Invokamet®, Invokamet XR®, Trijary XR®, Xigduo XR®, Steglujan® Do not take for 4 days before your procedure including the day of the procedure (resume taking after the procedure)                More information continued on back                    Medicine Instructions for Adults with Diabetes who Need a Bowel Prep  Page 2      On the Day of Surgery/Procedure  Follow the directions below based on the type of medicine you take for your diabetes.     Type of Medicine You Take Examples What to do   Long-Acting Insulin Lantus®, Levemir®, Tresiba®, Toujeo®, Basaglar®, Semglee®   If you usually take your Long-Acting Insulin in the morning, take the full dose as scheduled.   GLP-1 Agonists AdlyxinÒ, ByettaÒ, BydureonÒ, OzempicÒ, SoliquaÒ, TanzeumÒ, TrulicityÒ, VictozaÒ, Saxenda®, Rybelsus® Do NOT take this medicine on the day of your procedure (resume taking after the procedure)       On the Day of Surgery/Procedure (continued)  Except for the morning Long-Acting Insulin, DO NOT take ANY diabetic medicine on the day of your procedure unless you were instructed by the doctor who manages your diabetes medicines.    Continue to check your blood sugars.  If you have an insulin pump, ask your endocrinologist for instructions at least 3 days before your procedure. NOTE: If you are not able to  ask your endocrinologist in advance, on the day of the procedure set your insulin pump to your basal rate only. Bring your insulin pump supplies to the hospital.     If you have any questions about taking your diabetes medicines prior to your procedure, please contact the doctor who manages your diabetes medicines.

## 2024-11-15 NOTE — TELEPHONE ENCOUNTER
Scheduled date of colonoscopy (as of today): 12/23/24  Physician performing colonoscopy: LOUIE  Location of colonoscopy: [facility]  Bowel prep reviewed with patient: GINNA / RADHA  Instructions reviewed with patient by: CONECPCION  Clearances:  N/A

## 2024-11-15 NOTE — TELEPHONE ENCOUNTER
11/15/24  Screened by: Laurie Griggs    Referring Provider     Pre- Screening:     There is no height or weight on file to calculate BMI.  Has patient been referred for a routine screening Colonoscopy? yes  Is the patient between 45-75 years old? yes      Previous Colonoscopy yes   If yes:    Date:     Facility:     Reason:       Does the patient want to see a Gastroenterologist prior to their procedure OR are they having any GI symptoms? no    Has the patient been hospitalized or had abdominal surgery in the past 6 months? no    Does the patient use supplemental oxygen? no    Does the patient take Coumadin, Lovenox, Plavix, Elliquis, Xarelto, or other blood thinning medication? no    Has the patient had a stroke, cardiac event, or stent placed in the past year? no      If patient is between 45yrs - 49yrs, please advise patient that we will have to confirm benefits & coverage with their insurance company for a routine screening colonoscopy.

## 2024-11-21 ENCOUNTER — RESULT REVIEW (OUTPATIENT)
Age: 54
End: 2024-11-21

## 2024-11-21 ENCOUNTER — APPOINTMENT (OUTPATIENT)
Dept: MRI IMAGING | Facility: CLINIC | Age: 54
End: 2024-11-21
Payer: COMMERCIAL

## 2024-11-21 PROCEDURE — A9585: CPT | Mod: JW

## 2024-11-21 PROCEDURE — 70543 MRI ORBT/FAC/NCK W/O &W/DYE: CPT

## 2024-12-04 ENCOUNTER — OFFICE VISIT (OUTPATIENT)
Dept: FAMILY MEDICINE CLINIC | Facility: CLINIC | Age: 54
End: 2024-12-04
Payer: COMMERCIAL

## 2024-12-04 VITALS
HEART RATE: 76 BPM | TEMPERATURE: 96.4 F | RESPIRATION RATE: 16 BRPM | BODY MASS INDEX: 31.18 KG/M2 | WEIGHT: 176 LBS | HEIGHT: 63 IN | SYSTOLIC BLOOD PRESSURE: 130 MMHG | DIASTOLIC BLOOD PRESSURE: 90 MMHG

## 2024-12-04 DIAGNOSIS — M54.50 ACUTE BILATERAL LOW BACK PAIN WITHOUT SCIATICA: Primary | ICD-10-CM

## 2024-12-04 DIAGNOSIS — M54.2 CERVICALGIA: ICD-10-CM

## 2024-12-04 PROCEDURE — 99213 OFFICE O/P EST LOW 20 MIN: CPT | Performed by: INTERNAL MEDICINE
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
## 2024-12-09 ENCOUNTER — ANESTHESIA EVENT (OUTPATIENT)
Dept: ANESTHESIOLOGY | Facility: HOSPITAL | Age: 54
End: 2024-12-09

## 2024-12-09 ENCOUNTER — ANESTHESIA (OUTPATIENT)
Dept: ANESTHESIOLOGY | Facility: HOSPITAL | Age: 54
End: 2024-12-09

## 2024-12-18 ENCOUNTER — TELEPHONE (OUTPATIENT)
Dept: GASTROENTEROLOGY | Facility: CLINIC | Age: 54
End: 2024-12-18

## 2024-12-18 NOTE — TELEPHONE ENCOUNTER
Spoke to pt  confirming pt's colonoscopy scheduled on 12/23/24 at [facility] with Dr Ornelas and pt informed she did not know how this got scheduled as she did not schedule it.  Pt wanted to meet with the doctor first before having procedure.  Pt now scheduled for an new pt office visit with Dr Rain in [place] office on 1/8/25.

## 2024-12-19 ENCOUNTER — EVALUATION (OUTPATIENT)
Dept: PHYSICAL THERAPY | Facility: CLINIC | Age: 54
End: 2024-12-19
Payer: COMMERCIAL

## 2024-12-19 DIAGNOSIS — M54.2 CERVICALGIA: ICD-10-CM

## 2024-12-19 DIAGNOSIS — M54.50 ACUTE BILATERAL LOW BACK PAIN WITHOUT SCIATICA: Primary | ICD-10-CM

## 2024-12-19 PROCEDURE — 97161 PT EVAL LOW COMPLEX 20 MIN: CPT
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
## 2025-01-07 DIAGNOSIS — E89.0 POSTOPERATIVE HYPOTHYROIDISM: ICD-10-CM

## 2025-01-07 NOTE — TELEPHONE ENCOUNTER
Medication under historical provider     Loni Lawler MD -[facility]    Synthroid 150 MCG tablet -Take 1 tablet (150 mcg total) by mouth daily in the early morning    [facility] at [place], [place] - [address] [facility]

## 2025-01-08 RX ORDER — LEVOTHYROXINE SODIUM 150 MCG
150 TABLET ORAL
Qty: 90 TABLET | Refills: 1 | Status: SHIPPED | OUTPATIENT
Start: 2025-01-08

## 2025-01-24 ENCOUNTER — APPOINTMENT (OUTPATIENT)
Dept: OPHTHALMOLOGY | Facility: CLINIC | Age: 55
End: 2025-01-24
Payer: COMMERCIAL

## 2025-01-24 ENCOUNTER — NON-APPOINTMENT (OUTPATIENT)
Age: 55
End: 2025-01-24

## 2025-01-24 PROCEDURE — 92133 CPTRZD OPH DX IMG PST SGM ON: CPT

## 2025-01-24 PROCEDURE — 92083 EXTENDED VISUAL FIELD XM: CPT

## 2025-01-24 PROCEDURE — 92014 COMPRE OPH EXAM EST PT 1/>: CPT

## 2025-01-27 ENCOUNTER — EVALUATION (OUTPATIENT)
Dept: PHYSICAL THERAPY | Facility: CLINIC | Age: 55
End: 2025-01-27
Payer: COMMERCIAL

## 2025-01-27 DIAGNOSIS — M54.2 CERVICALGIA: ICD-10-CM

## 2025-01-27 DIAGNOSIS — M54.50 ACUTE BILATERAL LOW BACK PAIN WITHOUT SCIATICA: Primary | ICD-10-CM

## 2025-01-27 PROCEDURE — 97112 NEUROMUSCULAR REEDUCATION: CPT

## 2025-01-27 PROCEDURE — 97530 THERAPEUTIC ACTIVITIES: CPT

## 2025-01-27 PROCEDURE — 97110 THERAPEUTIC EXERCISES: CPT

## 2025-01-27 NOTE — PROGRESS NOTES
Daily Note     Today's date: 2025  Patient name: Yary Walker  : 1970  MRN: [id number]  Referring provider: Loni Lawler MD  Dx:   Encounter Diagnosis     ICD-10-CM    1. Acute bilateral low back pain without sciatica  M54.50 PT plan of care cert/re-cert      2. Cervicalgia  M54.2 PT plan of care cert/re-cert          Start Time: 1100  Stop Time: 1140  Total time in clinic (min): 40 minutes    Subjective: Pt reports that she feels the same and nothing had changed since she was last in. Pt takes tylenol/motrin as needed.      Pain   2024   Current 4/10 LBP; 1/10 neck    Best LBP 4/10    Worst 1010 LBP       Objective: See treatment diary below        Lumbar Spine ROM   2024    Flexion 100%    Extension 100%*    Lt Rotation 100%*    Rt Rotation 100% *    Lt Lateral Flexion 100%*    Rt Lateral Flexion 100%     (*) = reproduction of patient's reported pain    Cervical Spine ROM   2024    Flexion 100%    Extension 100%    Lt Rotation 75%    Rt Rotation 100%     Lt Lateral Flexion 100%    Rt Lateral Flexion 75%*           Mechanical Assessment  Lumbar 2024    During testing After testing Effect   DEVYN Increase pain Stayed increased Increased pain with L side bending   Prone press ups Remained 4/10 Focal pain Improved side bending decreased pain       Assessment: Tolerated treatment well. Addressed LBP with prone press ups and found ROM improvements and decreased pain through ROM. Focused on LBP his session due to higher levels of irritability. Address cervical pain nv. Followed up with postural strengthening and pt tolerated. Pt found relief following supermans. Patient exhibited good technique with therapeutic exercises and would benefit from continued PT    Goals    Short Term Goals (5 weeks):   In progress  Patient will be independent with basic HEP.  Patient will report >50% reduction in pain.  Patient will demonstrate >1/3 improvement in MMT grade as applicable  PT will be able  to work at computer for work for >2 hours with <3/10 pain    Long Term Goals (10 weeks):  Patient will be independent in a comprehensive home exercise program  Patient FOTO score will improve by 10 to meet MDC  Patient will self-report >75% improvement in function  Pt will achieve full AROM of cervical spine with <1/10 pain  Pt yanira chieve full AROM of lumbar spine with <1/10 pain      Plan: POC start date 12/19/24.  Extend POC 4 weeks from 2/27/25  therefore POC to end 3/27/25.        Insurance:  AMA/CMS Eval/ Re-eval Auth #/ Referral # Total units  Start date  Expiration date Extension  Visit limitation?  PT only or  PT+OT? Co-Insurance   CMS 12/19/2024 Auth requested          CMS 1/27/25      18                                           POC Start Date POC Expiration Date Signed POC?   12/19/2024 10 weeks - 2/27/2025        Precautions:   Past Medical History:   Diagnosis Date    Cervical stenosis of spine     Diabetes mellitus (HCC) 05/2024    Fibrocystic disease of both breasts     More so the left breast    Hyperthyroidism     Sciatic nerve pain     Getting PT       Date 12/19/2024 1/27       Visit Number IE        FOTO Tracking Intake survey     Follow-up #1   Manual         Thoracic mobs         Lumbar mobs                                    TherEx         LTR         SKTC/DKTC          Mechanical exam Mechanical    HEP-PPU with self OP                                                    Neuro Re-Ed         TrA isos           Supermans 3x10         wall clocks 3x5 YTB         Wall slides with lift off- increased R LBP                                           TherAct         Patient education 8' 10'       Posture education  performed       Lifting mechanics                           Gait Training                                    Modalities

## 2025-01-30 ENCOUNTER — OFFICE VISIT (OUTPATIENT)
Dept: PHYSICAL THERAPY | Facility: CLINIC | Age: 55
End: 2025-01-30
Payer: COMMERCIAL

## 2025-01-30 DIAGNOSIS — M54.2 CERVICALGIA: ICD-10-CM

## 2025-01-30 DIAGNOSIS — M54.50 ACUTE BILATERAL LOW BACK PAIN WITHOUT SCIATICA: Primary | ICD-10-CM

## 2025-01-30 PROCEDURE — 97110 THERAPEUTIC EXERCISES: CPT

## 2025-01-30 NOTE — PROGRESS NOTES
Daily Note     Today's date: 2025  Patient name: Yary Walker  : 1970  MRN: [id number]  Referring provider: Loni Lawler MD  Dx:   Encounter Diagnosis     ICD-10-CM    1. Acute bilateral low back pain without sciatica  M54.50       2. Cervicalgia  M54.2             Start Time: 1157  Stop Time: 1230  Total time in clinic (min): 33 minutes    Subjective: Pt reports about 4/10 pain in low back and 2x10 pain in neck.           Objective: See treatment diary below        Mechanical Assessment      Assessment: Tolerated treatment well. Shortened session due to pt tardiness. Performed postural strengthening. Pt given cues for maintaining lumbar extension. Pt tolerated exercises with proper muscle fatigue reported. Back pain reduced with exercises.  Patient exhibited good technique with therapeutic exercises and would benefit from continued PT          Plan: Continue per POC.       Insurance:  AMA/CMS Eval/ Re-eval Auth #/ Referral # Total units  Start date  Expiration date Extension  Visit limitation?  PT only or  PT+OT? Co-Insurance   CMS 2024 Auth requested          CMS 25      18                                           POC Start Date POC Expiration Date Signed POC?   2024 10 weeks - 2025        Precautions:   Past Medical History:   Diagnosis Date    Cervical stenosis of spine     Diabetes mellitus (HCC) 2024    Fibrocystic disease of both breasts     More so the left breast    Hyperthyroidism     Sciatic nerve pain     Getting PT       Date 2024      Visit Number IE        FOTO Tracking Intake survey     Follow-up #1   Manual         Thoracic mobs         Lumbar mobs                                    TherEx         LTR         SKTC/DKTC          Mechanical exam Mechanical    HEP-PPU with self OP PPU with PT OP x10                                                   Neuro Re-Ed         TrA isos           Supermans 3x10 Supermans 2x10        wall clocks 3x5 YTB  Wall clocks 2x10 YTB        Wall slides with lift off- increased R LBP          Paloff press #7.5 2x10 ea. At [facility]         Rows #17.5 3x10         Shoulder extension #12.5 3x10               TherAct         Patient education 8' 10'       Posture education  performed       Lifting mechanics                           Gait Training                                    Modalities

## 2025-03-14 ENCOUNTER — TELEPHONE (OUTPATIENT)
Dept: FAMILY MEDICINE CLINIC | Facility: CLINIC | Age: 55
End: 2025-03-14

## 2025-03-14 DIAGNOSIS — M54.50 ACUTE BILATERAL LOW BACK PAIN WITHOUT SCIATICA: Primary | ICD-10-CM

## 2025-03-14 NOTE — TELEPHONE ENCOUNTER
Patient called the RX Refill Line. Message is being forwarded to the office.     Patient called to request the doctor to send a new script for her physical therapy.    Please contact patient at [phone] with any questions

## 2025-03-17 ENCOUNTER — EVALUATION (OUTPATIENT)
Dept: PHYSICAL THERAPY | Facility: CLINIC | Age: 55
End: 2025-03-17
Payer: COMMERCIAL

## 2025-03-17 DIAGNOSIS — M54.2 CERVICALGIA: ICD-10-CM

## 2025-03-17 DIAGNOSIS — M54.50 ACUTE BILATERAL LOW BACK PAIN WITHOUT SCIATICA: Primary | ICD-10-CM

## 2025-03-17 PROCEDURE — 97110 THERAPEUTIC EXERCISES: CPT

## 2025-03-17 PROCEDURE — 97530 THERAPEUTIC ACTIVITIES: CPT

## 2025-03-17 NOTE — PROGRESS NOTES
Daily Note     Today's date: 3/17/2025  Patient name: Yary Walker  : 1970  MRN: [id number]  Referring provider: Loni Lawler MD  Dx:   Encounter Diagnosis     ICD-10-CM    1. Acute bilateral low back pain without sciatica  M54.50       2. Cervicalgia  M54.2             Start Time: 1415  Stop Time: 1500  Total time in clinic (min): 45 minutes    Subjective: Pt reports that neck pain is middle to left side and does not radiate. Pt reports that she continues to have bilateral back pain that she describes as an ache and denies radiating symptoms. Pt reports that she will stretch and take tylenol or motrin to avoid getting to a 10/10 pain      Pain   2024   Current 4/10 LBP; 4/10 neck    Best LBP 4/10    Worst 10/10 LBP       Objective: See treatment diary below        Lumbar Spine ROM   2024    Flexion 100%    Extension 100%*    Lt Rotation 100%*    Rt Rotation 100% *    Lt Lateral Flexion 100%*    Rt Lateral Flexion 100%*     (*) = reproduction of patient's reported pain    Cervical Spine ROM   2024    Flexion 100%    Extension 50%*    Lt Rotation 75%*    Rt Rotation 100%     Lt Lateral Flexion 100%    Rt Lateral Flexion 75%*       Assessment: Pt is a 55 y.o female who presents with cervical pain and low back pain that does not radiate into LE. Pt had a lapse in care due to personal schedule. Pt returns stating that back pain and neck pain remain in moderate to high irritability. Performed mechanical assessment took neck pain from 7/10 (from base of spine to medial border of L scapula) to <3/10 with centralization utilizing repeated cervical retraction with PT OP. Thoracic extension with PT P-A OP reduced symptoms and centralized  Pt would benefit from PT for symptom modulation, improved ROM, and improved ability with perform functional tasks without pain.     Goals    Short Term Goals (5 weeks):   In progress  Patient will be independent with basic HEP.  Patient will report >50% reduction  in pain.  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  Extend POC 8 weeks   therefore POC to end 5/12/25.        Insurance:  AMA/CMS Eval/ Re-eval Auth #/ Referral # Total units  Start date  Expiration date Extension  Visit limitation?  PT only or  PT+OT? Co-Insurance   CMS 12/19/2024 Auth requested          CMS 1/27/25      18      3/17 requesting                                    POC Start Date POC Expiration Date Signed POC?   3/17/25 8 weeks - 5/12/2025        Precautions:   Past Medical History:   Diagnosis Date    Cervical stenosis of spine     Diabetes mellitus (HCC) 05/2024    Fibrocystic disease of both breasts     More so the left breast    Hyperthyroidism     Sciatic nerve pain     Getting PT       Date 12/19/2024 1/27 1/30/25 3/17/25     Visit Number IE        FOTO Tracking Intake survey     Follow-up #1   Manual         Thoracic mobs         Lumbar mobs                                    TherEx         LTR         SKTC/DKTC          Mechanical exam Mechanical    HEP-PPU with self OP PPU with PT OP x10 Mechanical assessment    Thoracic extension with PT OP                                                  Neuro Re-Ed         TrA isos           Supermans 3x10 Supermans 2x10        wall clocks 3x5 YTB Wall clocks 2x10 YTB        Wall slides with lift off- increased R LBP          Paloff press #7.5 2x10 ea. At [facility]         Rows #17.5 3x10         Shoulder extension #12.5 3x10               TherAct         Patient education 8' 10'        Posture education  performed       Lifting mechanics                           Gait Training                                    Modalities

## 2025-03-25 ENCOUNTER — APPOINTMENT (OUTPATIENT)
Dept: PHYSICAL THERAPY | Facility: CLINIC | Age: 55
End: 2025-03-25
Payer: COMMERCIAL

## 2025-03-26 ENCOUNTER — APPOINTMENT (OUTPATIENT)
Dept: PHYSICAL THERAPY | Facility: CLINIC | Age: 55
End: 2025-03-26
Payer: COMMERCIAL

## 2025-03-31 ENCOUNTER — OFFICE VISIT (OUTPATIENT)
Dept: FAMILY MEDICINE CLINIC | Facility: CLINIC | Age: 55
End: 2025-03-31
Payer: COMMERCIAL

## 2025-03-31 VITALS
HEIGHT: 63 IN | SYSTOLIC BLOOD PRESSURE: 120 MMHG | DIASTOLIC BLOOD PRESSURE: 80 MMHG | TEMPERATURE: 96.8 F | HEART RATE: 76 BPM | WEIGHT: 177.4 LBS | BODY MASS INDEX: 31.43 KG/M2

## 2025-03-31 DIAGNOSIS — M48.061 SPINAL STENOSIS OF LUMBAR REGION, UNSPECIFIED WHETHER NEUROGENIC CLAUDICATION PRESENT: ICD-10-CM

## 2025-03-31 DIAGNOSIS — M54.2 CERVICALGIA: Primary | ICD-10-CM

## 2025-03-31 DIAGNOSIS — E11.9 TYPE 2 DIABETES MELLITUS WITHOUT COMPLICATION, WITHOUT LONG-TERM CURRENT USE OF INSULIN (HCC): ICD-10-CM

## 2025-03-31 PROCEDURE — 99213 OFFICE O/P EST LOW 20 MIN: CPT | Performed by: INTERNAL MEDICINE

## 2025-03-31 RX ORDER — ASPIRIN 81 MG/1
81 TABLET, CHEWABLE ORAL DAILY
COMMUNITY

## 2025-03-31 NOTE — PROGRESS NOTES
"Name: Yary Walker      : 1970      MRN: [id number]  Encounter Provider: Loni Lawler MD  Encounter Date: 3/31/2025   Encounter department: [facility]  :  Assessment & Plan  Cervicalgia    Orders:    Ambulatory Referral to Physical Therapy; Future    Spinal stenosis of lumbar region, unspecified whether neurogenic claudication present    Orders:    Ambulatory Referral to Physical Therapy; Future    Type 2 diabetes mellitus without complication, without long-term current use of insulin ([facility])    Lab Results   Component Value Date    HGBA1C 12.0 (H) 2024                   History of Present Illness   She needs a re-order for PT.  She is feeling a bit better.  Her neck is still a big issue and her lower back is a pain as well.  She is not taking any of the medications she was given, is now taking tylenol PRN.  There are no neurologic symptoms.       Review of Systems   Constitutional: Negative.    Respiratory: Negative.     Cardiovascular: Negative.    Musculoskeletal:  Positive for back pain, neck pain and neck stiffness. Negative for gait problem.   Neurological: Negative.        Objective   /80   Pulse 76   Temp (!) 96.8 °F (36 °C)   Ht 5' 3\" (1.6 m)   Wt 80.5 kg (177 lb 6.4 oz)   LMP 12/15/2024 (Approximate)   BMI 31.42 kg/m²      Physical Exam  Constitutional:       Appearance: Normal appearance.   HENT:      Head: Normocephalic and atraumatic.   Eyes:      Pupils: Pupils are equal, round, and reactive to light.   Cardiovascular:      Rate and Rhythm: Normal rate and regular rhythm.      Heart sounds: No murmur heard.     No friction rub. No gallop.   Pulmonary:      Effort: Pulmonary effort is normal.      Breath sounds: Normal breath sounds. No wheezing or rales.   Musculoskeletal:         General: No swelling.      Cervical back: Spasms and tenderness present. Decreased range of motion.      Lumbar back: No tenderness. Negative right straight leg raise test and negative " left straight leg raise test.   Neurological:      Mental Status: She is alert.

## 2025-06-03 ENCOUNTER — DOCUMENTATION (OUTPATIENT)
Dept: ADMINISTRATIVE | Facility: OTHER | Age: 55
End: 2025-06-03

## 2025-06-03 NOTE — PROGRESS NOTES
06/03/25 10:57 AM     DM A1c outreach is not required, PATIENT SEEN WITHIN 3 MONTHS    Thank you.  Georgia Taylor MA  [facility]

## 2025-07-23 ENCOUNTER — TELEPHONE (OUTPATIENT)
Dept: OTHER | Facility: OTHER | Age: 55
End: 2025-07-23

## 2025-07-23 NOTE — TELEPHONE ENCOUNTER
Patient called and is asking if the office can give her a call to schedule an physical therapy assessment appointment. Please advice

## 2025-07-28 ENCOUNTER — OFFICE VISIT (OUTPATIENT)
Dept: FAMILY MEDICINE CLINIC | Facility: CLINIC | Age: 55
End: 2025-07-28
Payer: COMMERCIAL

## 2025-07-28 VITALS
HEART RATE: 64 BPM | RESPIRATION RATE: 16 BRPM | HEIGHT: 63 IN | SYSTOLIC BLOOD PRESSURE: 124 MMHG | TEMPERATURE: 97.1 F | WEIGHT: 176 LBS | BODY MASS INDEX: 31.18 KG/M2 | DIASTOLIC BLOOD PRESSURE: 78 MMHG

## 2025-07-28 DIAGNOSIS — M48.061 SPINAL STENOSIS OF LUMBAR REGION WITHOUT NEUROGENIC CLAUDICATION: Primary | ICD-10-CM

## 2025-07-28 DIAGNOSIS — Z12.39 SCREENING BREAST EXAMINATION: ICD-10-CM

## 2025-07-28 DIAGNOSIS — E11.9 TYPE 2 DIABETES MELLITUS WITHOUT COMPLICATION, WITHOUT LONG-TERM CURRENT USE OF INSULIN (HCC): ICD-10-CM

## 2025-07-28 PROCEDURE — 99213 OFFICE O/P EST LOW 20 MIN: CPT | Performed by: INTERNAL MEDICINE

## 2025-08-01 ENCOUNTER — APPOINTMENT (OUTPATIENT)
Dept: OPHTHALMOLOGY | Facility: CLINIC | Age: 55
End: 2025-08-01
Payer: COMMERCIAL

## 2025-08-01 ENCOUNTER — NON-APPOINTMENT (OUTPATIENT)
Age: 55
End: 2025-08-01

## 2025-08-01 PROCEDURE — 92133 CPTRZD OPH DX IMG PST SGM ON: CPT

## 2025-08-01 PROCEDURE — 92014 COMPRE OPH EXAM EST PT 1/>: CPT

## 2025-08-20 ENCOUNTER — EVALUATION (OUTPATIENT)
Dept: PHYSICAL THERAPY | Facility: CLINIC | Age: 55
End: 2025-08-20
Payer: COMMERCIAL

## 2025-08-20 DIAGNOSIS — M54.2 CERVICAL PAIN: Primary | ICD-10-CM

## 2025-08-20 DIAGNOSIS — M54.50 ACUTE MIDLINE LOW BACK PAIN WITHOUT SCIATICA: ICD-10-CM

## 2025-08-20 PROCEDURE — 97161 PT EVAL LOW COMPLEX 20 MIN: CPT

## 2025-09-11 ENCOUNTER — TRANSCRIPTION ENCOUNTER (OUTPATIENT)
Age: 55
End: 2025-09-11

## 2025-09-18 ENCOUNTER — TRANSCRIPTION ENCOUNTER (OUTPATIENT)
Age: 55
End: 2025-09-18

## 2025-09-19 ENCOUNTER — NON-APPOINTMENT (OUTPATIENT)
Age: 55
End: 2025-09-19

## 2025-09-19 ENCOUNTER — APPOINTMENT (OUTPATIENT)
Dept: OPHTHALMOLOGY | Facility: CLINIC | Age: 55
End: 2025-09-19
Payer: COMMERCIAL

## 2025-09-19 PROCEDURE — 92020 GONIOSCOPY: CPT

## 2025-09-19 PROCEDURE — 92014 COMPRE OPH EXAM EST PT 1/>: CPT

## 2025-09-19 PROCEDURE — 92133 CPTRZD OPH DX IMG PST SGM ON: CPT

## 2025-09-19 PROCEDURE — 92083 EXTENDED VISUAL FIELD XM: CPT
